# Patient Record
Sex: MALE | Race: WHITE | Employment: OTHER | URBAN - METROPOLITAN AREA
[De-identification: names, ages, dates, MRNs, and addresses within clinical notes are randomized per-mention and may not be internally consistent; named-entity substitution may affect disease eponyms.]

---

## 2019-12-24 ENCOUNTER — HOSPITAL ENCOUNTER (INPATIENT)
Age: 49
LOS: 10 days | Discharge: NURSING, REHAB OR LONG-TERM CARE | DRG: 444 | End: 2020-01-03
Attending: EMERGENCY MEDICINE | Admitting: INTERNAL MEDICINE
Payer: MEDICARE

## 2019-12-24 ENCOUNTER — APPOINTMENT (OUTPATIENT)
Dept: CT IMAGING | Age: 49
DRG: 444 | End: 2019-12-24
Payer: MEDICARE

## 2019-12-24 ENCOUNTER — APPOINTMENT (OUTPATIENT)
Dept: GENERAL RADIOLOGY | Age: 49
DRG: 444 | End: 2019-12-24
Payer: MEDICARE

## 2019-12-24 PROBLEM — K81.0 ACUTE CHOLECYSTITIS: Status: ACTIVE | Noted: 2019-12-24

## 2019-12-24 LAB
A/G RATIO: 0.5 (ref 1.1–2.2)
A/G RATIO: 0.5 (ref 1.1–2.2)
ALBUMIN SERPL-MCNC: 2.2 G/DL (ref 3.4–5)
ALBUMIN SERPL-MCNC: 2.2 G/DL (ref 3.4–5)
ALP BLD-CCNC: 132 U/L (ref 40–129)
ALP BLD-CCNC: 137 U/L (ref 40–129)
ALT SERPL-CCNC: 21 U/L (ref 10–40)
ALT SERPL-CCNC: 24 U/L (ref 10–40)
AMMONIA: 58 UMOL/L (ref 16–60)
AMORPHOUS: ABNORMAL /HPF
ANION GAP SERPL CALCULATED.3IONS-SCNC: 10 MMOL/L (ref 3–16)
ANION GAP SERPL CALCULATED.3IONS-SCNC: 8 MMOL/L (ref 3–16)
AST SERPL-CCNC: 115 U/L (ref 15–37)
AST SERPL-CCNC: 74 U/L (ref 15–37)
BASOPHILS ABSOLUTE: 0 K/UL (ref 0–0.2)
BASOPHILS RELATIVE PERCENT: 0.3 %
BILIRUB SERPL-MCNC: 2.4 MG/DL (ref 0–1)
BILIRUB SERPL-MCNC: 2.6 MG/DL (ref 0–1)
BILIRUBIN URINE: ABNORMAL
BLOOD, URINE: ABNORMAL
BUN BLDV-MCNC: 14 MG/DL (ref 7–20)
BUN BLDV-MCNC: 14 MG/DL (ref 7–20)
CALCIUM SERPL-MCNC: 8.7 MG/DL (ref 8.3–10.6)
CALCIUM SERPL-MCNC: 8.7 MG/DL (ref 8.3–10.6)
CHLORIDE BLD-SCNC: 86 MMOL/L (ref 99–110)
CHLORIDE BLD-SCNC: 87 MMOL/L (ref 99–110)
CLARITY: CLEAR
CO2: 31 MMOL/L (ref 21–32)
CO2: 33 MMOL/L (ref 21–32)
COLOR: YELLOW
CREAT SERPL-MCNC: <0.5 MG/DL (ref 0.9–1.3)
CREAT SERPL-MCNC: <0.5 MG/DL (ref 0.9–1.3)
EOSINOPHILS ABSOLUTE: 0 K/UL (ref 0–0.6)
EOSINOPHILS RELATIVE PERCENT: 0.4 %
EPITHELIAL CELLS, UA: ABNORMAL /HPF
GFR AFRICAN AMERICAN: >60
GFR AFRICAN AMERICAN: >60
GFR NON-AFRICAN AMERICAN: >60
GFR NON-AFRICAN AMERICAN: >60
GLOBULIN: 4.4 G/DL
GLOBULIN: 4.6 G/DL
GLUCOSE BLD-MCNC: 84 MG/DL (ref 70–99)
GLUCOSE BLD-MCNC: 87 MG/DL (ref 70–99)
GLUCOSE URINE: 100 MG/DL
HCT VFR BLD CALC: 41.1 % (ref 40.5–52.5)
HEMOGLOBIN: 14.3 G/DL (ref 13.5–17.5)
KETONES, URINE: ABNORMAL MG/DL
LACTIC ACID: 2.1 MMOL/L (ref 0.4–2)
LEUKOCYTE ESTERASE, URINE: NEGATIVE
LIPASE: 31 U/L (ref 13–60)
LYMPHOCYTES ABSOLUTE: 1.2 K/UL (ref 1–5.1)
LYMPHOCYTES RELATIVE PERCENT: 42.6 %
MCH RBC QN AUTO: 36.6 PG (ref 26–34)
MCHC RBC AUTO-ENTMCNC: 34.7 G/DL (ref 31–36)
MCV RBC AUTO: 105.7 FL (ref 80–100)
MICROSCOPIC EXAMINATION: YES
MONOCYTES ABSOLUTE: 0.3 K/UL (ref 0–1.3)
MONOCYTES RELATIVE PERCENT: 10.6 %
NEUTROPHILS ABSOLUTE: 1.3 K/UL (ref 1.7–7.7)
NEUTROPHILS RELATIVE PERCENT: 46.1 %
NITRITE, URINE: NEGATIVE
PDW BLD-RTO: 14 % (ref 12.4–15.4)
PH UA: 5.5 (ref 5–8)
PLATELET # BLD: 116 K/UL (ref 135–450)
PMV BLD AUTO: 9.1 FL (ref 5–10.5)
POTASSIUM SERPL-SCNC: 4.7 MMOL/L (ref 3.5–5.1)
POTASSIUM SERPL-SCNC: 5.4 MMOL/L (ref 3.5–5.1)
PROTEIN UA: NEGATIVE MG/DL
RBC # BLD: 3.89 M/UL (ref 4.2–5.9)
RBC UA: ABNORMAL /HPF (ref 0–2)
SODIUM BLD-SCNC: 127 MMOL/L (ref 136–145)
SODIUM BLD-SCNC: 128 MMOL/L (ref 136–145)
SPECIFIC GRAVITY UA: 1.01 (ref 1–1.03)
TOTAL PROTEIN: 6.6 G/DL (ref 6.4–8.2)
TOTAL PROTEIN: 6.8 G/DL (ref 6.4–8.2)
URINE TYPE: ABNORMAL
UROBILINOGEN, URINE: 2 E.U./DL
WBC # BLD: 2.8 K/UL (ref 4–11)
WBC UA: ABNORMAL /HPF (ref 0–5)

## 2019-12-24 PROCEDURE — 6360000004 HC RX CONTRAST MEDICATION: Performed by: EMERGENCY MEDICINE

## 2019-12-24 PROCEDURE — 2700000000 HC OXYGEN THERAPY PER DAY

## 2019-12-24 PROCEDURE — 71045 X-RAY EXAM CHEST 1 VIEW: CPT

## 2019-12-24 PROCEDURE — 85025 COMPLETE CBC W/AUTO DIFF WBC: CPT

## 2019-12-24 PROCEDURE — 82140 ASSAY OF AMMONIA: CPT

## 2019-12-24 PROCEDURE — 80053 COMPREHEN METABOLIC PANEL: CPT

## 2019-12-24 PROCEDURE — 5A09457 ASSISTANCE WITH RESPIRATORY VENTILATION, 24-96 CONSECUTIVE HOURS, CONTINUOUS POSITIVE AIRWAY PRESSURE: ICD-10-PCS | Performed by: INTERNAL MEDICINE

## 2019-12-24 PROCEDURE — 83605 ASSAY OF LACTIC ACID: CPT

## 2019-12-24 PROCEDURE — 87086 URINE CULTURE/COLONY COUNT: CPT

## 2019-12-24 PROCEDURE — 99291 CRITICAL CARE FIRST HOUR: CPT

## 2019-12-24 PROCEDURE — 80165 DIPROPYLACETIC ACID FREE: CPT

## 2019-12-24 PROCEDURE — 81001 URINALYSIS AUTO W/SCOPE: CPT

## 2019-12-24 PROCEDURE — 6360000002 HC RX W HCPCS

## 2019-12-24 PROCEDURE — 83690 ASSAY OF LIPASE: CPT

## 2019-12-24 PROCEDURE — 51702 INSERT TEMP BLADDER CATH: CPT

## 2019-12-24 PROCEDURE — 2580000003 HC RX 258: Performed by: NURSE PRACTITIONER

## 2019-12-24 PROCEDURE — 96375 TX/PRO/DX INJ NEW DRUG ADDON: CPT

## 2019-12-24 PROCEDURE — 2580000003 HC RX 258

## 2019-12-24 PROCEDURE — 74177 CT ABD & PELVIS W/CONTRAST: CPT

## 2019-12-24 PROCEDURE — 93005 ELECTROCARDIOGRAM TRACING: CPT | Performed by: NURSE PRACTITIONER

## 2019-12-24 PROCEDURE — 1200000000 HC SEMI PRIVATE

## 2019-12-24 PROCEDURE — 94761 N-INVAS EAR/PLS OXIMETRY MLT: CPT

## 2019-12-24 PROCEDURE — 87040 BLOOD CULTURE FOR BACTERIA: CPT

## 2019-12-24 PROCEDURE — 96376 TX/PRO/DX INJ SAME DRUG ADON: CPT

## 2019-12-24 PROCEDURE — 6360000002 HC RX W HCPCS: Performed by: NURSE PRACTITIONER

## 2019-12-24 PROCEDURE — 96365 THER/PROPH/DIAG IV INF INIT: CPT

## 2019-12-24 RX ORDER — PHENOBARBITAL 30 MG/1
30 TABLET ORAL 3 TIMES DAILY
Status: DISCONTINUED | OUTPATIENT
Start: 2019-12-25 | End: 2020-01-03 | Stop reason: HOSPADM

## 2019-12-24 RX ORDER — ACETAMINOPHEN 650 MG/1
650 SUPPOSITORY RECTAL EVERY 4 HOURS PRN
Status: DISCONTINUED | OUTPATIENT
Start: 2019-12-24 | End: 2020-01-03 | Stop reason: HOSPADM

## 2019-12-24 RX ORDER — SODIUM CHLORIDE 0.9 % (FLUSH) 0.9 %
10 SYRINGE (ML) INJECTION PRN
Status: DISCONTINUED | OUTPATIENT
Start: 2019-12-24 | End: 2020-01-03 | Stop reason: HOSPADM

## 2019-12-24 RX ORDER — ONDANSETRON 2 MG/ML
4 INJECTION INTRAMUSCULAR; INTRAVENOUS EVERY 6 HOURS PRN
Status: DISCONTINUED | OUTPATIENT
Start: 2019-12-24 | End: 2020-01-03 | Stop reason: HOSPADM

## 2019-12-24 RX ORDER — SODIUM CHLORIDE 9 MG/ML
INJECTION, SOLUTION INTRAVENOUS CONTINUOUS
Status: DISCONTINUED | OUTPATIENT
Start: 2019-12-25 | End: 2019-12-26

## 2019-12-24 RX ORDER — POTASSIUM CHLORIDE 7.45 MG/ML
10 INJECTION INTRAVENOUS PRN
Status: DISCONTINUED | OUTPATIENT
Start: 2019-12-24 | End: 2020-01-03 | Stop reason: HOSPADM

## 2019-12-24 RX ORDER — FENTANYL CITRATE 50 UG/ML
25 INJECTION, SOLUTION INTRAMUSCULAR; INTRAVENOUS ONCE
Status: COMPLETED | OUTPATIENT
Start: 2019-12-24 | End: 2019-12-24

## 2019-12-24 RX ORDER — FENTANYL CITRATE 50 UG/ML
INJECTION, SOLUTION INTRAMUSCULAR; INTRAVENOUS
Status: COMPLETED
Start: 2019-12-24 | End: 2019-12-24

## 2019-12-24 RX ORDER — 0.9 % SODIUM CHLORIDE 0.9 %
500 INTRAVENOUS SOLUTION INTRAVENOUS ONCE
Status: COMPLETED | OUTPATIENT
Start: 2019-12-24 | End: 2019-12-24

## 2019-12-24 RX ORDER — POTASSIUM CHLORIDE 20 MEQ/1
40 TABLET, EXTENDED RELEASE ORAL PRN
Status: DISCONTINUED | OUTPATIENT
Start: 2019-12-24 | End: 2020-01-03 | Stop reason: HOSPADM

## 2019-12-24 RX ORDER — SODIUM CHLORIDE 0.9 % (FLUSH) 0.9 %
10 SYRINGE (ML) INJECTION EVERY 12 HOURS SCHEDULED
Status: DISCONTINUED | OUTPATIENT
Start: 2019-12-25 | End: 2020-01-03 | Stop reason: HOSPADM

## 2019-12-24 RX ORDER — MAGNESIUM SULFATE 1 G/100ML
1 INJECTION INTRAVENOUS PRN
Status: DISCONTINUED | OUTPATIENT
Start: 2019-12-24 | End: 2020-01-03 | Stop reason: HOSPADM

## 2019-12-24 RX ORDER — LEVOTHYROXINE SODIUM 88 UG/1
88 TABLET ORAL DAILY
Status: DISCONTINUED | OUTPATIENT
Start: 2019-12-25 | End: 2020-01-03 | Stop reason: HOSPADM

## 2019-12-24 RX ADMIN — PIPERACILLIN AND TAZOBACTAM 3.38 G: 3; .375 INJECTION, POWDER, LYOPHILIZED, FOR SOLUTION INTRAVENOUS at 22:48

## 2019-12-24 RX ADMIN — SODIUM CHLORIDE: 9 INJECTION, SOLUTION INTRAVENOUS at 23:51

## 2019-12-24 RX ADMIN — FENTANYL CITRATE 25 MCG: 50 INJECTION, SOLUTION INTRAMUSCULAR; INTRAVENOUS at 23:16

## 2019-12-24 RX ADMIN — IOPAMIDOL 75 ML: 755 INJECTION, SOLUTION INTRAVENOUS at 21:38

## 2019-12-24 RX ADMIN — FENTANYL CITRATE 25 MCG: 50 INJECTION, SOLUTION INTRAMUSCULAR; INTRAVENOUS at 20:10

## 2019-12-24 RX ADMIN — SODIUM CHLORIDE 500 ML: 9 INJECTION, SOLUTION INTRAVENOUS at 20:10

## 2019-12-24 RX ADMIN — SODIUM CHLORIDE 500 ML: 9 INJECTION, SOLUTION INTRAVENOUS at 23:52

## 2019-12-24 ASSESSMENT — PAIN SCALES - GENERAL
PAINLEVEL_OUTOF10: 0
PAINLEVEL_OUTOF10: 9
PAINLEVEL_OUTOF10: 7

## 2019-12-24 NOTE — ED PROVIDER NOTES
[facility] Emergency Department    CHIEF COMPLAINT  Abdominal Pain (seen at [facility] recently for 5 days for pancreatitis and Possible colitis, large amount of multiple complaints, had colonoscopy on dec 30)      HISTORY OF PRESENT ILLNESS  Chris Hook is a 44 y.o. male who presents to the ED complaining of worsening pain over the past few days. Patients sister at bedside Josh Hackett is non verbal but normally does not grown as much as he is now. \"  Patient at bedside reports that he is mentally challenged and nonverbal but is at baseline other than groaning more than usual.  Patient reports approximately 1 month ago he was having similar symptoms with fever and he was taken to [facility] where he was admitted. Patient sister reports that he had elevated thyroid, liver, and kidney issues at that time. Patient was diagnosed with possibly pancreatitis versus colitis and put on antibiotics. Patient sister reports that he did improve shortly however symptoms seem to have returned but no fever at this time. Patient does reside in a group home and per caretaker he has had increase in gas, belching, and loose bowel movements today. Patient does have a G-tube placed approximately 1 year. Patient receives all medications and food through G-tube per sister. Patient seen Dr. Julio Cesar Cruz with [facility] on 12/17 and has a colonoscopy and endoscopy scheduled for 12/30. Patient's sister reports no dark or tarry stools. No known fever or chills. Patient is paralyzed on the right side upper extremity and on the waist down. Patient was hypoxic upon arrival with a room air of 85%. Patient does not normally wear oxygen. Patient sister reports some congestion and cough over the past few weeks. No other complaints, modifying factors or associated symptoms. Nursing notes reviewed.    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 Can give every day prn      PEDIATRIC MULTIPLE VIT-C-FA PO Take by mouth daily      Corn Oil OIL 1 capsule by Gastrostomy Tube route 3 times daily      docusate (COLACE) 50 MG/5ML liquid 100 mg by Per G Tube route 2 times daily      polyethylene glycol (GLYCOLAX) packet 8.5 g by Per G Tube route daily      loratadine (CLARITIN) 10 MG tablet 10 mg by Per G Tube route daily as needed      LORazepam (ATIVAN) 0.5 MG tablet 0.5 mg by Per G Tube route as needed for Anxiety.  magnesium hydroxide (MILK OF MAGNESIA) 400 MG/5ML suspension Take 30 mLs by mouth daily as needed for Constipation      MILK THISTLE PO 1 capsule by Gastric Tube route 2 times daily      Ostomy Supplies (MOISTURE BARRIER SKIN) OINT by Does not apply route 2 times daily      PHENobarbital (LUMINAL) 30 MG tablet 30 mg by Per G Tube route.  Give 30 mg tid on Sun-Tues-Thurs-Sat and 30 mg bid on Mon-Wed-Fri      promethazine (PHENERGAN) 25 MG suppository Place 25 mg rectally every 4 hours as needed for Nausea      acetaminophen (Q-PAP CHILDRENS) 160 MG/5ML suspension by Per G Tube route every 4 hours as needed for Fever Give 20 cc every 4 hours prn      levothyroxine (SYNTHROID) 88 MCG tablet 88 mcg by Per G Tube route Daily      valproate (DEPAKENE) 250 MG/5ML solution 700-1,000 mg by Per G Tube route 3 times daily Takes 750 mg daily and 1000 mg bid       No Known Allergies    REVIEW OF SYSTEMS  10 systems reviewed, pertinent positives per HPI otherwise noted to be negative    PHYSICAL EXAM  /74   Pulse 115   Temp 97.8 °F (36.6 °C) (Oral)   Resp 14   Ht 4' 9\" (1.448 m)   Wt 125 lb (56.7 kg)   SpO2 94%   BMI 27.05 kg/m²   GENERAL APPEARANCE: Awake and alert. Cooperative. No acute distress. HEAD: Normocephalic. Atraumatic. EYES: PERRL. Sclera, jaundice bilaterally. ENT: Mucous membranes are pink and dry. Nares unobstructed. NECK: Supple. HEART: No Chest wall tenderness. LUNGS: Respirations unlabored. CTAB. Good air exchange. Speaking comfortably in full sentences. ABDOMEN: Soft. Non-distended. +bs x 4 quadrants. No guarding or rebound. gtube present to mid abdomen. EXTREMITIES: No peripheral edema. Limited range of motion due to paralysis. Unable to move right upper and bilateral lower extremities. All extremities neurovascularly intact. Cap refill. Pulses palpable equal bilaterally. SKIN: Warm and dry. No acute rashes. NEUROLOGICAL: Alert. PSYCHIATRIC: Normal mood and affect. RADIOLOGY  Ct Abdomen Pelvis W Iv Contrast Additional Contrast? None    Result Date: 12/24/2019  EXAMINATION: CT OF THE ABDOMEN AND PELVIS WITH CONTRAST 12/24/2019 9:16 pm TECHNIQUE: CT of the abdomen and pelvis was performed with the administration of intravenous contrast. Multiplanar reformatted images are provided for review. Dose modulation, iterative reconstruction, and/or weight based adjustment of the mA/kV was utilized to reduce the radiation dose to as low as reasonably achievable. COMPARISON: 12/24/2009 HISTORY: ORDERING SYSTEM PROVIDED HISTORY: abd pain, gtube present TECHNOLOGIST PROVIDED HISTORY: Reason for exam:->abd pain, gtube present Additional Contrast?->None Reason for Exam: abd pain, hx  pancreatitis recently Acuity: Acute Type of Exam: Initial Relevant Medical/Surgical History: pt nonverbal hx from family FINDINGS: Lower Chest: There is bibasilar atelectasis. Organs: Low-attenuation of the liver may represent fatty infiltration. There is no focal liver lesion. There appears to be mucosal enhancement of the gallbladder with minimal adjacent inflammatory stranding. No definite cholelithiasis is identified. There is no biliary ductal dilation. The spleen, pancreas, adrenal glands and kidneys are unremarkable. GI/Bowel: Percutaneous gastrostomy tube is in place. Small bowel caliber is normal.  The appendix is normal.  Fatty infiltration in the wall of the cecum and ascending colon is nonspecific though may indicate chronic inflammation. Pelvis: There is a catheter in the urinary bladder. Peritoneum/Retroperitoneum: There is trace fluid in the right paracolic gutter with minimal right upper quadrant fat stranding. There is no free fluid in the pelvis and there is no adenopathy. Aortic caliber is normal without dissection. Bones/Soft Tissues: No acute findings. Severe scoliosis deformity and chronic deformity of the hips is again identified. Spinal fixation rods are also again noted. Acute cholecystitis is suspected. Xr Chest Portable    Result Date: 12/24/2019  EXAMINATION: ONE XRAY VIEW OF THE CHEST 12/24/2019 4:14 pm COMPARISON: 12/30/2009 HISTORY: ORDERING SYSTEM PROVIDED HISTORY: cough, hypoxia TECHNOLOGIST PROVIDED HISTORY: Reason for exam:->cough, hypoxia Reason for Exam: Abdominal Pain (seen at [facility] for 5 days for pancreatitis and Possible colitis, large amount of multiple complaints, had colonoscopy on dec 30) Acuity: Acute Type of Exam: Initial FINDINGS: There are low lung volumes with secondary bronchovascular crowding. No acute focal infiltrate is identified. The heart and mediastinal contours are unremarkable. No pneumothorax is seen. No free air. No acute bony abnormality. Scoliotic curvature of the thoracic spine to the right is again detected. Negative low lung volume portable examination. ED COURSE   I have evaluated this patient in collaboration with Dr Haydee Flores. Vital signs stable, patient tachycardic upon arrival. EKG was performed and interpreted by ER attending.   Patient was also hypoxic upon arrival at 85% on room air patient was placed on 2 L nasal cannula with improvement of oxygen saturation level to 94 to 95%. Patient was given IV fluids and heart rate improved. Patient received fentanyl for pain, with good relief. Phan catheter was placed, urinalysis obtained. Urinalysis revealed 3-5 WBCs, 20-50 RBCs, negative for nitrites. Urine culture sent and pending. Initial CMP revealed slight hemolysis therefore repeat was performed. Sodium 127, chloride 86, alkaline phos 137, total bili 2.6. CBC revealed leukopenia at 2.8 with a stable hemoglobin and hematocrit. Lipase normal at 31. Ammonia 58. Blood cultures drawn x2 pending. Lactic acid lately elevated at 2.1. Valproic acid level pending. Radiological imaging of the chest revealed negative low lung volume per radiologist.  CT abdomen and pelvis performed and revealed acute cholecystitis. IV Zosyn was initiated during ED course of treatment and consult performed with on-call general surgeon. I did speak with  regarding Mr. Kodi Bravo and ED findings, patient will be admitted under hospitalist services. A discussion was had with family of Mr. Kodi Bravo regarding ED findings, results, and admission. All questions were answered. Patient family agreeable with plan of care, treatment, and admission at this time. Consult performed per myself with hospitalist regarding admission. Total critical care time is 35 minutes, which excludes separately billable procedures and updating family. Time spent is specifically for management of the presenting complaint and symptoms initially, direct bedside care, reevaluation, review of records, and consultation. There was a high probability of clinically significant life-threatening deterioration in the patient's condition, which required my urgent intervention.    MDM  Results for orders placed or performed during the hospital encounter of 12/24/19   CBC auto differential   Result Value Ref Range    WBC 2.8 (L) 4.0 - 11.0 K/uL    RBC 3.89 (L) 4.20 - 5.90 M/uL Elevated lactic acid level    7. Hypoxia        Vitals:  Blood pressure 106/83, pulse 98, temperature 97.8 °F (36.6 °C), temperature source Oral, resp. rate 17, height 4' 9\" (1.448 m), weight 125 lb (56.7 kg), SpO2 94 %.       DISPOSITION  Patient was admitted in stable condition                 HARJINDER Ling CNP  12/25/19 0000

## 2019-12-25 LAB
ALBUMIN SERPL-MCNC: 2.3 G/DL (ref 3.4–5)
ALP BLD-CCNC: 142 U/L (ref 40–129)
ALT SERPL-CCNC: 22 U/L (ref 10–40)
ANION GAP SERPL CALCULATED.3IONS-SCNC: 8 MMOL/L (ref 3–16)
AST SERPL-CCNC: 79 U/L (ref 15–37)
BILIRUB SERPL-MCNC: 2.7 MG/DL (ref 0–1)
BILIRUBIN DIRECT: 2 MG/DL (ref 0–0.3)
BILIRUBIN, INDIRECT: 0.7 MG/DL (ref 0–1)
BUN BLDV-MCNC: 12 MG/DL (ref 7–20)
CALCIUM SERPL-MCNC: 8.5 MG/DL (ref 8.3–10.6)
CHLORIDE BLD-SCNC: 93 MMOL/L (ref 99–110)
CO2: 31 MMOL/L (ref 21–32)
CREAT SERPL-MCNC: <0.5 MG/DL (ref 0.9–1.3)
EKG ATRIAL RATE: 98 BPM
EKG DIAGNOSIS: NORMAL
EKG P AXIS: 63 DEGREES
EKG P-R INTERVAL: 130 MS
EKG Q-T INTERVAL: 344 MS
EKG QRS DURATION: 76 MS
EKG QTC CALCULATION (BAZETT): 439 MS
EKG R AXIS: 106 DEGREES
EKG T AXIS: 44 DEGREES
EKG VENTRICULAR RATE: 98 BPM
GFR AFRICAN AMERICAN: >60
GFR NON-AFRICAN AMERICAN: >60
GLUCOSE BLD-MCNC: 74 MG/DL (ref 70–99)
GLUCOSE BLD-MCNC: 74 MG/DL (ref 70–99)
GLUCOSE BLD-MCNC: 83 MG/DL (ref 70–99)
GLUCOSE BLD-MCNC: 88 MG/DL (ref 70–99)
HCT VFR BLD CALC: 42.6 % (ref 40.5–52.5)
HEMOGLOBIN: 14.5 G/DL (ref 13.5–17.5)
MCH RBC QN AUTO: 36.3 PG (ref 26–34)
MCHC RBC AUTO-ENTMCNC: 34 G/DL (ref 31–36)
MCV RBC AUTO: 106.7 FL (ref 80–100)
PDW BLD-RTO: 14.4 % (ref 12.4–15.4)
PERFORMED ON: NORMAL
PLATELET # BLD: 102 K/UL (ref 135–450)
PMV BLD AUTO: 9 FL (ref 5–10.5)
POTASSIUM REFLEX MAGNESIUM: 4.4 MMOL/L (ref 3.5–5.1)
RBC # BLD: 3.99 M/UL (ref 4.2–5.9)
SODIUM BLD-SCNC: 132 MMOL/L (ref 136–145)
TOTAL PROTEIN: 6.7 G/DL (ref 6.4–8.2)
WBC # BLD: 2.9 K/UL (ref 4–11)

## 2019-12-25 PROCEDURE — 36415 COLL VENOUS BLD VENIPUNCTURE: CPT

## 2019-12-25 PROCEDURE — 93010 ELECTROCARDIOGRAM REPORT: CPT | Performed by: INTERNAL MEDICINE

## 2019-12-25 PROCEDURE — 2580000003 HC RX 258: Performed by: INTERNAL MEDICINE

## 2019-12-25 PROCEDURE — 80076 HEPATIC FUNCTION PANEL: CPT

## 2019-12-25 PROCEDURE — 94761 N-INVAS EAR/PLS OXIMETRY MLT: CPT

## 2019-12-25 PROCEDURE — 6370000000 HC RX 637 (ALT 250 FOR IP): Performed by: INTERNAL MEDICINE

## 2019-12-25 PROCEDURE — 6360000002 HC RX W HCPCS: Performed by: INTERNAL MEDICINE

## 2019-12-25 PROCEDURE — 84443 ASSAY THYROID STIM HORMONE: CPT

## 2019-12-25 PROCEDURE — 85027 COMPLETE CBC AUTOMATED: CPT

## 2019-12-25 PROCEDURE — 99222 1ST HOSP IP/OBS MODERATE 55: CPT | Performed by: SURGERY

## 2019-12-25 PROCEDURE — 84439 ASSAY OF FREE THYROXINE: CPT

## 2019-12-25 PROCEDURE — 1200000000 HC SEMI PRIVATE

## 2019-12-25 PROCEDURE — 80048 BASIC METABOLIC PNL TOTAL CA: CPT

## 2019-12-25 PROCEDURE — 80184 ASSAY OF PHENOBARBITAL: CPT

## 2019-12-25 PROCEDURE — 2700000000 HC OXYGEN THERAPY PER DAY

## 2019-12-25 RX ORDER — SODIUM CHLORIDE 9 MG/ML
INJECTION, SOLUTION INTRAVENOUS
Status: COMPLETED
Start: 2019-12-25 | End: 2019-12-24

## 2019-12-25 RX ORDER — MORPHINE SULFATE 2 MG/ML
2 INJECTION, SOLUTION INTRAMUSCULAR; INTRAVENOUS EVERY 4 HOURS PRN
Status: DISCONTINUED | OUTPATIENT
Start: 2019-12-25 | End: 2020-01-02

## 2019-12-25 RX ADMIN — MORPHINE SULFATE 2 MG: 2 INJECTION, SOLUTION INTRAMUSCULAR; INTRAVENOUS at 16:17

## 2019-12-25 RX ADMIN — PHENOBARBITAL 30 MG: 30 TABLET ORAL at 14:38

## 2019-12-25 RX ADMIN — Medication 10 ML: at 09:25

## 2019-12-25 RX ADMIN — PHENOBARBITAL 30 MG: 30 TABLET ORAL at 00:39

## 2019-12-25 RX ADMIN — PHENOBARBITAL 30 MG: 30 TABLET ORAL at 20:37

## 2019-12-25 RX ADMIN — MORPHINE SULFATE 2 MG: 2 INJECTION, SOLUTION INTRAMUSCULAR; INTRAVENOUS at 20:55

## 2019-12-25 RX ADMIN — Medication 10 ML: at 20:38

## 2019-12-25 RX ADMIN — PHENOBARBITAL 30 MG: 30 TABLET ORAL at 09:25

## 2019-12-25 RX ADMIN — ONDANSETRON 4 MG: 2 INJECTION INTRAMUSCULAR; INTRAVENOUS at 09:19

## 2019-12-25 RX ADMIN — VALPROIC ACID 500 MG: 250 SOLUTION ORAL at 00:39

## 2019-12-25 RX ADMIN — PIPERACILLIN AND TAZOBACTAM 3.38 G: 3; .375 INJECTION, POWDER, FOR SOLUTION INTRAVENOUS at 20:37

## 2019-12-25 RX ADMIN — PIPERACILLIN AND TAZOBACTAM 3.38 G: 3; .375 INJECTION, POWDER, FOR SOLUTION INTRAVENOUS at 05:09

## 2019-12-25 RX ADMIN — VALPROIC ACID 500 MG: 250 SOLUTION ORAL at 09:25

## 2019-12-25 RX ADMIN — ONDANSETRON 4 MG: 2 INJECTION INTRAMUSCULAR; INTRAVENOUS at 21:02

## 2019-12-25 RX ADMIN — VALPROIC ACID 500 MG: 250 SOLUTION ORAL at 20:37

## 2019-12-25 RX ADMIN — SODIUM CHLORIDE: 9 INJECTION, SOLUTION INTRAVENOUS at 11:07

## 2019-12-25 RX ADMIN — VALPROIC ACID 500 MG: 250 SOLUTION ORAL at 14:38

## 2019-12-25 RX ADMIN — MORPHINE SULFATE 2 MG: 2 INJECTION, SOLUTION INTRAMUSCULAR; INTRAVENOUS at 09:21

## 2019-12-25 RX ADMIN — PIPERACILLIN AND TAZOBACTAM 3.38 G: 3; .375 INJECTION, POWDER, FOR SOLUTION INTRAVENOUS at 12:37

## 2019-12-25 ASSESSMENT — PAIN SCALES - GENERAL
PAINLEVEL_OUTOF10: 6
PAINLEVEL_OUTOF10: 6
PAINLEVEL_OUTOF10: 7

## 2019-12-25 NOTE — ED PROVIDER NOTES
I independently performed a history and physical on Katya Nowak. All diagnostic, treatment, and disposition decisions were made by myself in conjunction with the advanced practice provider. For further details of [facility]re[facility] emergency department encounter, please see Jim Lino NP's documentation. 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 Patient here because of groaning and perceived abdominal pain. On exam patient is awake and is nonverbal but does seem to respond to pain. Tender in the abdomen. Labs and imaging consistent with acute cholecystitis. Patient being admitted for further treatment and evaluation. EKG  The Ekg interpreted by me shows  normal sinus rhythm with a rate of 98  Axis is   Right axis deviation  QTc is  normal  Intervals and Durations are unremarkable. ST Segments: normal  No prior EKG available for comparison.              Sarah Nieves MD  12/25/19 [id number]

## 2019-12-25 NOTE — H&P
Hospital Medicine History & Physical      PCP: Ambar Linarichard    Date of Admission: 12/24/2019    Date of Service: Pt seen/examined on 12/25/19 and Admitted to Inpatient     Chief Complaint:  Abdominal pain       History Of Present Illness: The patient is a 44 y.o. male who presents to [facility] ([facility]) with acute onset and progressive course of abdominal pain. Pt is non verbal hx obtained from family and records. Per family pt. With increasing groans over the last few days pt. 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 No pertinent surgical history. Medications Prior to Admission:    Prior to Admission medications    Medication Sig Start Date End Date Taking? Authorizing Provider   acetaminophen (TYLENOL) 650 MG suppository Place 650 mg rectally every 4 hours as needed for Fever   Yes Historical Provider, MD   albuterol (PROVENTIL) (5 MG/ML) 0.5% nebulizer solution Take 2.5 mg by nebulization every 4 hours as needed for Wheezing   Yes Historical Provider, MD   aluminum & magnesium hydroxide-simethicone (MAALOX) 200-200-20 MG/5ML SUSP suspension Take 15 mLs by mouth daily   Yes Historical Provider, MD   bisacodyl (DULCOLAX) 10 MG suppository Place 10 mg rectally every other day Give every other evening. Can give every day prn   Yes Historical Provider, MD   PEDIATRIC MULTIPLE VIT-C-FA PO Take by mouth daily   Yes Historical Provider, MD   [place] Oil OIL 1 capsule by Gastrostomy Tube route 3 times daily   Yes Historical Provider, MD   docusate (COLACE) 50 MG/5ML liquid 100 mg by Per G Tube route 2 times daily   Yes Historical Provider, MD   polyethylene glycol (GLYCOLAX) packet 8.5 g by Per G Tube route daily   Yes Historical Provider, MD   LORazepam (ATIVAN) 0.5 MG tablet 0.5 mg by Per G Tube route as needed for Anxiety. Yes Historical Provider, MD   magnesium hydroxide (MILK OF MAGNESIA) 400 MG/5ML suspension Take 30 mLs by mouth daily as needed for Constipation   Yes Historical Provider, MD   MILK THISTLE PO 1 capsule by Gastric Tube route 2 times daily   Yes Historical Provider, MD   PHENobarbital (LUMINAL) 30 MG tablet 30 mg by Per G Tube route. Give 30 mg tid on Sun-Tues-Thurs-Sat and 30 mg bid on Mon-Wed-Fri   Yes Historical Provider, MD   acetaminophen (Q-PAP CHILDRENS) 160 MG/5ML suspension by Per G Tube route every 4 hours as needed for Fever Give 20 cc every 4 hours prn   Yes Historical Provider, MD   levothyroxine (SYNTHROID) 88 MCG tablet 88 mcg by Per G Tube route Daily   Yes Historical Provider, MD   valproate (DEPAKENE) 250 MG/5ML solution 700-1,000 mg by Per G Tube route 3 times daily Takes 750 mg daily and 1000 mg bid   Yes Historical Provider, MD   dicyclomine (BENTYL) 20 MG tablet Take 20 mg by mouth every 8 hours as needed    Historical Provider, MD   loratadine (CLARITIN) 10 MG tablet 10 mg by Per G Tube route daily as needed    Historical Provider, MD   Ostomy Supplies (MOISTURE BARRIER SKIN) OINT by Does not apply route 2 times daily    Historical Provider, MD   promethazine (PHENERGAN) 25 MG suppository Place 25 mg rectally every 4 hours as needed for Nausea    Historical Provider, MD       Allergies:  Patient has no known allergies. Social History:  The patient currently lives group home     TOBACCO:   reports that he has never smoked. He has never used smokeless tobacco.  ETOH:   has no history on file for alcohol.       Family History:  Reviewed in detail and negative for DM, Early CAD, Cancer, CVA. Positive as follows:    History reviewed. No pertinent family history. REVIEW OF SYSTEMS:   Positive for abdominal pain  and as noted in the HPI. All other systems reviewed and negative. PHYSICAL EXAM:    /76   Pulse 99   Temp 98.1 °F (36.7 °C) (Axillary)   Resp 20   Ht 4' 9\" (1.448 m)   Wt 125 lb (56.7 kg)   SpO2 95%   BMI 27.05 kg/m²     General appearance: No apparent distress appears stated age HEENT Normal cephalic, atraumatic without obvious deformity. Pupils equal, round, and reactive to light. Extra ocular muscles intact. Conjunctivae/corneas clear. Neck: Supple, No jugular venous distention/bruits. Trachea midline without thyromegaly or adenopathy with full range of motion. Lungs: Clear to auscultation, bilaterally without Rales/Wheezes/Rhonchi with good respiratory effort. Heart: Regular rate and rhythm with Normal S1/S2 without murmurs, rubs or gallops, point of maximum impulse non-displaced  Abdomen: Soft, non-tender or non-distended without rigidity or guarding and positive bowel sounds all four quadrants. Extremities: No clubbing, cyanosis, or edema bilaterally. .  Skin: Skin color, texture, turgor normal.  No rashes or lesions. Neurologic: Alert ,, grossly non-focal.  Mental status: Alert,   Capillary Refill: Acceptable  < 3 seconds  Peripheral Pulses: +3 Easily felt, not easily obliterated with pressure      CXR:  I have reviewed the CXR  EKG:  I have reviewed the EKG   CBC   Recent Labs     12/24/19 1942   WBC 2.8*   HGB 14.3   HCT 41.1   *      RENAL  Recent Labs     12/24/19 1942 12/24/19 1949   * 127*   K 5.4* 4.7   CL 87* 86*   CO2 31 33*   BUN 14 14   CREATININE <0.5* <0.5*     LFT'S  Recent Labs     12/24/19 1942 12/24/19 1949   * 74*   ALT 24 21   BILITOT 2.4* 2.6*   ALKPHOS 132* 137*     COAG  No results for input(s): INR in the last 72 hours.   CARDIAC ENZYMES  No results for input(s): CKTOTAL, CKMB, CKMBINDEX, TROPONINI in the last 72 hours. U/A:    Lab Results   Component Value Date    COLORU Yellow 12/24/2019    WBCUA 3-5 12/24/2019    RBCUA 20-50 12/24/2019    CLARITYU Clear 12/24/2019    SPECGRAV 1.015 12/24/2019    LEUKOCYTESUR Negative 12/24/2019    BLOODU LARGE 12/24/2019    GLUCOSEU 100 12/24/2019    GLUCOSEU Negative 12/24/2009    AMORPHOUS Rare 12/24/2019       ABG  No results found for: XWI6TUP, BEART, X2QAUHUI, PHART, THGBART, LAW1YHH, PO2ART, HFY8JGH        Active Hospital Problems    Diagnosis Date Noted    Acute cholecystitis [K81.0] 12/24/2019         PHYSICIANS CERTIFICATION:    I certify that Jason Phillips is expected to be hospitalized for more  than 2 midnights based on the following assessment and plan:      ASSESSMENT/PLAN:    Hyponatremia  Dysphagia  seizures   hypothyroidism    Npo  ivf  abx  Pain control  Resume home meds  Surgery consult    DVT Prophylaxis: lovenox   Diet: Diet NPO Effective Now  Code Status: DNR-CCA    Dispo - inpt. Dayla Alpers, MD    Thank you [facility] for the opportunity to be involved in this patient's care. If you have any questions or concerns please feel free to contact me at [phone].

## 2019-12-25 NOTE — PROGRESS NOTES
Patient was seen by 1 of my partners this morning    Acute cholecystitis-with abdominal pain-morphine 2 mg IV every 4 as needed, continue Zosyn and surgical input appreciated awaiting for gallbladder ultrasound      ? Cerebral palsy/quadriparesis /aphasia seizures-on phenobarbital and valproic acid-get levels      Blood sugar was 79 this morning-asymptomatic monitor D50 as needed      Hyponatremia-improved with normal saline    Macrocytosis-TSH X67 and folic acid      Microscopic hematuria-May repeat and follow-up as an outpatient

## 2019-12-25 NOTE — CONSULTS
PRN  acetaminophen (TYLENOL) suppository 650 mg, 650 mg, Rectal, Q4H PRN  0.9 % sodium chloride infusion, , Intravenous, Continuous  piperacillin-tazobactam (ZOSYN) 3.375 g in dextrose 5 % 50 mL IVPB extended infusion (mini-bag), 3.375 g, Intravenous, Q8H  Prior to Admission medications    Medication Sig Start Date End Date Taking? Authorizing Provider   acetaminophen (TYLENOL) 650 MG suppository Place 650 mg rectally every 4 hours as needed for Fever   Yes Historical Provider, MD   albuterol (PROVENTIL) (5 MG/ML) 0.5% nebulizer solution Take 2.5 mg by nebulization every 4 hours as needed for Wheezing   Yes Historical Provider, MD   aluminum & magnesium hydroxide-simethicone (MAALOX) 200-200-20 MG/5ML SUSP suspension Take 15 mLs by mouth daily   Yes Historical Provider, MD   bisacodyl (DULCOLAX) 10 MG suppository Place 10 mg rectally every other day Give every other evening. Can give every day prn   Yes Historical Provider, MD   PEDIATRIC MULTIPLE VIT-C-FA PO Take by mouth daily   Yes Historical Provider, MD   [place] Oil OIL 1 capsule by Gastrostomy Tube route 3 times daily   Yes Historical Provider, MD   docusate (COLACE) 50 MG/5ML liquid 100 mg by Per G Tube route 2 times daily   Yes Historical Provider, MD   polyethylene glycol (GLYCOLAX) packet 8.5 g by Per G Tube route daily   Yes Historical Provider, MD   LORazepam (ATIVAN) 0.5 MG tablet 0.5 mg by Per G Tube route as needed for Anxiety. Yes Historical Provider, MD   magnesium hydroxide (MILK OF MAGNESIA) 400 MG/5ML suspension Take 30 mLs by mouth daily as needed for Constipation   Yes Historical Provider, MD   MILK THISTLE PO 1 capsule by Gastric Tube route 2 times daily   Yes Historical Provider, MD   PHENobarbital (LUMINAL) 30 MG tablet 30 mg by Per G Tube route.  Give 30 mg tid on Sun-Tues-Thurs-Sat and 30 mg bid on Mon-Wed-Fri   Yes Historical Provider, MD   acetaminophen (Q-PAP CHILDRENS) 160 MG/5ML suspension by Per G Tube route every 4 hours as needed for clubbing or cyanosis, 1+ pitting edema lower extremities  NEUROLOGIC:  Mental Status Exam:  Level of Alertness:   awake  PSYCHIATRIC:   Non-verbal  SKIN:  no bruising or bleeding, normal skin color, texture, turgor and no redness, warmth, or swelling    DATA:    CBC:   Recent Labs     12/24/19 1942 12/25/19  0651   WBC 2.8* 2.9*   HGB 14.3 14.5   HCT 41.1 42.6   * 102*     BMP:    Recent Labs     12/24/19 1942 12/24/19 1949 12/25/19  0651   * 127* 132*   K 5.4* 4.7 4.4   CL 87* 86* 93*   CO2 31 33* 31   BUN 14 14 12   CREATININE <0.5* <0.5* <0.5*   GLUCOSE 87 84 74     Hepatic:   Recent Labs     12/24/19 1942 12/24/19 1949 12/25/19  0651   * 74* 79*   ALT 24 21 22   BILITOT 2.4* 2.6* 2.7*   ALKPHOS 132* 137* 142*     Mag:    No results for input(s): MG in the last 72 hours. Phos:   No results for input(s): PHOS in the last 72 hours. INR: No results for input(s): INR in the last 72 hours. Radiology Review: Images personally reviewed by me. EXAMINATION:  CT OF THE ABDOMEN AND PELVIS WITH CONTRAST 12/24/2019 9:16 pm    TECHNIQUE:  CT of the abdomen and pelvis was performed with the administration of  intravenous contrast. Multiplanar reformatted images are provided for review. Dose modulation, iterative reconstruction, and/or weight based adjustment of  the mA/kV was utilized to reduce the radiation dose to as low as reasonably  achievable. COMPARISON:  12/24/2009    HISTORY:  ORDERING SYSTEM PROVIDED HISTORY: abd pain, gtube present  TECHNOLOGIST PROVIDED HISTORY:  Reason for exam:->abd pain, gtube present  Additional Contrast?->None  Reason for Exam: abd pain, hx  pancreatitis recently  Acuity: Acute  Type of Exam: Initial  Relevant Medical/Surgical History: pt nonverbal hx from family    FINDINGS:  Lower Chest: There is bibasilar atelectasis.     Organs: Low-attenuation of the liver may represent fatty infiltration.  There  is no focal liver lesion.  There appears to be mucosal enhancement of the  gallbladder with minimal adjacent inflammatory stranding.  No definite  cholelithiasis is identified. Ava Cedar is no biliary ductal dilation.  The  spleen, pancreas, adrenal glands and kidneys are unremarkable. GI/Bowel: Percutaneous gastrostomy tube is in place.  Small bowel caliber is  normal.  The appendix is normal.  Fatty infiltration in the wall of the cecum  and ascending colon is nonspecific though may indicate chronic inflammation. Pelvis: There is a catheter in the urinary bladder. Peritoneum/Retroperitoneum: There is trace fluid in the right paracolic  gutter with minimal right upper quadrant fat stranding.  There is no free  fluid in the pelvis and there is no adenopathy.  Aortic caliber is normal  without dissection. Bones/Soft Tissues: No acute findings.  Severe scoliosis deformity and  chronic deformity of the hips is again identified.  Spinal fixation rods are  also again noted. Impression:     Acute cholecystitis is suspected. IMPRESSION/RECOMMENDATIONS:    Possible cholecystitis. No leukocytosis or focal pain on my exam. Continue antibiotics and supportive care. CHeck GB US.  Further plans based on result of US    Electronically signed by Lenard De MD     [id number]

## 2019-12-25 NOTE — PROGRESS NOTES
4 Eyes Skin Assessment     The patient is being assess for  Admission    I agree that 2 RN's have performed a thorough Head to Toe Skin Assessment on the patient. ALL assessment sites listed below have been assessed. Areas assessed by both nurses: Katalina Solomon RN   [x]   Head, Face, and Ears   [x]   Shoulders, Back, and Chest  [x]   Arms, Elbows, and Hands   [x]   Coccyx, Sacrum, and IschIum  [x]   Legs, Feet, and Heels   Stage 2 wound on R and L foot, and in gluteal cleft. Pt heels dry, feet clubbed. Does the Patient have Skin Breakdown?   Yes LDA WOUND CARE was Initiated documentation include the [place]-wound, Wound Assessment, Measurements, Dressing Treatment, Drainage, and Color\",         Kael Prevention initiated:  Yes   Wound Care Orders initiated:  Yes      [id number] nurse consulted for Pressure Injury (Stage 3,4, Unstageable, DTI, NWPT, and Complex wounds), New and Established Ostomies:  No      Nurse 1 eSignature: Electronically signed by Hussein Malhotra RN on 12/25/19 at 2:56 AM    **SHARE this note so that the co-signing nurse is able to place an eSignature**    Nurse 2 eSignature: {Esignature:[id number]}

## 2019-12-25 NOTE — ED NOTES
[facility]   Re: admit: Cholecystitis, hyponatremia, leukopenia  Dr. Prince Thorpe responsed @ [address]  12/24/19 0653

## 2019-12-25 NOTE — PROGRESS NOTES
Pt admitted to unit by giana by ER PCA. Pt in stable condition, VSS. Mother at bedside. PT nonverbal. Assessment completed and charted. 4 eyed skin assessment completed. Pt currently on 4L NC. PEG tube. Pt currently NPO. Stage two present on admission on pt R and L foot, gluteal cleft. Podus boots in place, no preventative on bottom pt incontinent of stool. Phan in place and patent. NS running at 100 mL/hr. Will continue to monitor.

## 2019-12-25 NOTE — PROGRESS NOTES
[facility] contacted to fax over current medication list and advance directive. Awaiting information to update pt's chart.

## 2019-12-25 NOTE — ED NOTES
Bedside report to C3 RN. Patient taken from department in stable condition.      Erika Fernandez RN  12/24/19 1275

## 2019-12-26 ENCOUNTER — APPOINTMENT (OUTPATIENT)
Dept: GENERAL RADIOLOGY | Age: 49
DRG: 444 | End: 2019-12-26
Payer: MEDICARE

## 2019-12-26 ENCOUNTER — APPOINTMENT (OUTPATIENT)
Dept: ULTRASOUND IMAGING | Age: 49
DRG: 444 | End: 2019-12-26
Payer: MEDICARE

## 2019-12-26 LAB
ALBUMIN SERPL-MCNC: 2.3 G/DL (ref 3.4–5)
ALP BLD-CCNC: 175 U/L (ref 40–129)
ALT SERPL-CCNC: 31 U/L (ref 10–40)
ANION GAP SERPL CALCULATED.3IONS-SCNC: 6 MMOL/L (ref 3–16)
AST SERPL-CCNC: 132 U/L (ref 15–37)
BASE EXCESS ARTERIAL: 10.1 MMOL/L (ref -3–3)
BASE EXCESS ARTERIAL: 7.4 MMOL/L (ref -3–3)
BASOPHILS ABSOLUTE: 0 K/UL (ref 0–0.2)
BASOPHILS RELATIVE PERCENT: 0.6 %
BILIRUB SERPL-MCNC: 5.1 MG/DL (ref 0–1)
BILIRUBIN DIRECT: 4.4 MG/DL (ref 0–0.3)
BILIRUBIN, INDIRECT: 0.7 MG/DL (ref 0–1)
BUN BLDV-MCNC: 10 MG/DL (ref 7–20)
CALCIUM SERPL-MCNC: 8.6 MG/DL (ref 8.3–10.6)
CARBOXYHEMOGLOBIN ARTERIAL: 1 % (ref 0–1.5)
CARBOXYHEMOGLOBIN ARTERIAL: 1.8 % (ref 0–1.5)
CHLORIDE BLD-SCNC: 94 MMOL/L (ref 99–110)
CO2: 33 MMOL/L (ref 21–32)
CREAT SERPL-MCNC: <0.5 MG/DL (ref 0.9–1.3)
D DIMER: 403 NG/ML DDU (ref 0–229)
EKG ATRIAL RATE: 126 BPM
EKG DIAGNOSIS: NORMAL
EKG P AXIS: 42 DEGREES
EKG P-R INTERVAL: 124 MS
EKG Q-T INTERVAL: 310 MS
EKG QRS DURATION: 74 MS
EKG QTC CALCULATION (BAZETT): 448 MS
EKG R AXIS: 115 DEGREES
EKG T AXIS: 20 DEGREES
EKG VENTRICULAR RATE: 126 BPM
EOSINOPHILS ABSOLUTE: 0 K/UL (ref 0–0.6)
EOSINOPHILS RELATIVE PERCENT: 0.1 %
FOLATE: >20 NG/ML (ref 4.78–24.2)
GFR AFRICAN AMERICAN: >60
GFR NON-AFRICAN AMERICAN: >60
GLUCOSE BLD-MCNC: 107 MG/DL (ref 70–99)
GLUCOSE BLD-MCNC: 111 MG/DL (ref 70–99)
GLUCOSE BLD-MCNC: 138 MG/DL (ref 70–99)
GLUCOSE BLD-MCNC: 146 MG/DL (ref 70–99)
GLUCOSE BLD-MCNC: 98 MG/DL (ref 70–99)
HCO3 ARTERIAL: 37 MMOL/L (ref 21–29)
HCO3 ARTERIAL: 39.4 MMOL/L (ref 21–29)
HCT VFR BLD CALC: 39.3 % (ref 40.5–52.5)
HEMOGLOBIN, ART, EXTENDED: 13.9 G/DL (ref 13.5–17.5)
HEMOGLOBIN, ART, EXTENDED: 14.7 G/DL (ref 13.5–17.5)
HEMOGLOBIN: 13.3 G/DL (ref 13.5–17.5)
LYMPHOCYTES ABSOLUTE: 1.3 K/UL (ref 1–5.1)
LYMPHOCYTES RELATIVE PERCENT: 34.1 %
MCH RBC QN AUTO: 36.6 PG (ref 26–34)
MCHC RBC AUTO-ENTMCNC: 34 G/DL (ref 31–36)
MCV RBC AUTO: 107.9 FL (ref 80–100)
METHEMOGLOBIN ARTERIAL: 0.5 %
METHEMOGLOBIN ARTERIAL: 0.6 %
MONOCYTES ABSOLUTE: 0.8 K/UL (ref 0–1.3)
MONOCYTES RELATIVE PERCENT: 20.8 %
NEUTROPHILS ABSOLUTE: 1.8 K/UL (ref 1.7–7.7)
NEUTROPHILS RELATIVE PERCENT: 44.4 %
O2 CONTENT ARTERIAL: 19 ML/DL
O2 CONTENT ARTERIAL: 20 ML/DL
O2 SAT, ARTERIAL: 97.9 %
O2 SAT, ARTERIAL: 99.1 %
O2 THERAPY: ABNORMAL
O2 THERAPY: ABNORMAL
PCO2 ARTERIAL: 75.6 MMHG (ref 35–45)
PCO2 ARTERIAL: 78.9 MMHG (ref 35–45)
PDW BLD-RTO: 13.9 % (ref 12.4–15.4)
PERFORMED ON: ABNORMAL
PH ARTERIAL: 7.29 (ref 7.35–7.45)
PH ARTERIAL: 7.33 (ref 7.35–7.45)
PHENOBARBITAL LEVEL: 30.8 UG/ML (ref 15–35)
PLATELET # BLD: 117 K/UL (ref 135–450)
PMV BLD AUTO: 7.7 FL (ref 5–10.5)
PO2 ARTERIAL: 175 MMHG (ref 75–108)
PO2 ARTERIAL: 97 MMHG (ref 75–108)
POTASSIUM SERPL-SCNC: 4.9 MMOL/L (ref 3.5–5.1)
RBC # BLD: 3.64 M/UL (ref 4.2–5.9)
SODIUM BLD-SCNC: 133 MMOL/L (ref 136–145)
T4 FREE: 1.2 NG/DL (ref 0.9–1.8)
TCO2 ARTERIAL: 39.4 MMOL/L
TCO2 ARTERIAL: 41.7 MMOL/L
TOTAL PROTEIN: 6.6 G/DL (ref 6.4–8.2)
TSH REFLEX: 4.77 UIU/ML (ref 0.27–4.2)
URINE CULTURE, ROUTINE: NORMAL
VITAMIN B-12: >2000 PG/ML (ref 211–911)
WBC # BLD: 3.9 K/UL (ref 4–11)

## 2019-12-26 PROCEDURE — 6360000002 HC RX W HCPCS: Performed by: INTERNAL MEDICINE

## 2019-12-26 PROCEDURE — 82607 VITAMIN B-12: CPT

## 2019-12-26 PROCEDURE — 2580000003 HC RX 258: Performed by: INTERNAL MEDICINE

## 2019-12-26 PROCEDURE — 85379 FIBRIN DEGRADATION QUANT: CPT

## 2019-12-26 PROCEDURE — 80048 BASIC METABOLIC PNL TOTAL CA: CPT

## 2019-12-26 PROCEDURE — 6370000000 HC RX 637 (ALT 250 FOR IP): Performed by: INTERNAL MEDICINE

## 2019-12-26 PROCEDURE — 76705 ECHO EXAM OF ABDOMEN: CPT

## 2019-12-26 PROCEDURE — 71045 X-RAY EXAM CHEST 1 VIEW: CPT

## 2019-12-26 PROCEDURE — 36600 WITHDRAWAL OF ARTERIAL BLOOD: CPT

## 2019-12-26 PROCEDURE — 2700000000 HC OXYGEN THERAPY PER DAY

## 2019-12-26 PROCEDURE — 94640 AIRWAY INHALATION TREATMENT: CPT

## 2019-12-26 PROCEDURE — 80076 HEPATIC FUNCTION PANEL: CPT

## 2019-12-26 PROCEDURE — 82803 BLOOD GASES ANY COMBINATION: CPT

## 2019-12-26 PROCEDURE — 99232 SBSQ HOSP IP/OBS MODERATE 35: CPT | Performed by: SURGERY

## 2019-12-26 PROCEDURE — 31720 CLEARANCE OF AIRWAYS: CPT

## 2019-12-26 PROCEDURE — 85025 COMPLETE CBC W/AUTO DIFF WBC: CPT

## 2019-12-26 PROCEDURE — 94761 N-INVAS EAR/PLS OXIMETRY MLT: CPT

## 2019-12-26 PROCEDURE — 82746 ASSAY OF FOLIC ACID SERUM: CPT

## 2019-12-26 PROCEDURE — 2060000000 HC ICU INTERMEDIATE R&B

## 2019-12-26 PROCEDURE — 2500000003 HC RX 250 WO HCPCS: Performed by: INTERNAL MEDICINE

## 2019-12-26 PROCEDURE — 99291 CRITICAL CARE FIRST HOUR: CPT | Performed by: INTERNAL MEDICINE

## 2019-12-26 PROCEDURE — 93010 ELECTROCARDIOGRAM REPORT: CPT | Performed by: INTERNAL MEDICINE

## 2019-12-26 PROCEDURE — 93005 ELECTROCARDIOGRAM TRACING: CPT | Performed by: NURSE PRACTITIONER

## 2019-12-26 PROCEDURE — 94660 CPAP INITIATION&MGMT: CPT

## 2019-12-26 PROCEDURE — 36415 COLL VENOUS BLD VENIPUNCTURE: CPT

## 2019-12-26 RX ORDER — SODIUM CHLORIDE FOR INHALATION 0.9 %
3 VIAL, NEBULIZER (ML) INHALATION 3 TIMES DAILY
Status: COMPLETED | OUTPATIENT
Start: 2019-12-26 | End: 2019-12-27

## 2019-12-26 RX ORDER — FUROSEMIDE 10 MG/ML
40 INJECTION INTRAMUSCULAR; INTRAVENOUS ONCE
Status: COMPLETED | OUTPATIENT
Start: 2019-12-26 | End: 2019-12-26

## 2019-12-26 RX ORDER — LEVALBUTEROL TARTRATE 45 UG/1
1 AEROSOL, METERED ORAL EVERY 6 HOURS PRN
Status: DISCONTINUED | OUTPATIENT
Start: 2019-12-26 | End: 2020-01-03 | Stop reason: HOSPADM

## 2019-12-26 RX ADMIN — Medication 10 ML: at 08:28

## 2019-12-26 RX ADMIN — VALPROIC ACID 500 MG: 250 SOLUTION ORAL at 08:26

## 2019-12-26 RX ADMIN — IPRATROPIUM BROMIDE 0.5 MG: 0.5 SOLUTION RESPIRATORY (INHALATION) at 20:53

## 2019-12-26 RX ADMIN — ENOXAPARIN SODIUM 40 MG: 40 INJECTION SUBCUTANEOUS at 08:27

## 2019-12-26 RX ADMIN — LEVOTHYROXINE SODIUM 88 MCG: 0.09 TABLET ORAL at 06:00

## 2019-12-26 RX ADMIN — FUROSEMIDE 40 MG: 10 INJECTION, SOLUTION INTRAMUSCULAR; INTRAVENOUS at 14:04

## 2019-12-26 RX ADMIN — ISODIUM CHLORIDE 3 ML: 0.03 SOLUTION RESPIRATORY (INHALATION) at 20:53

## 2019-12-26 RX ADMIN — IPRATROPIUM BROMIDE 0.5 MG: 0.5 SOLUTION RESPIRATORY (INHALATION) at 12:38

## 2019-12-26 RX ADMIN — VALPROIC ACID 500 MG: 250 SOLUTION ORAL at 14:04

## 2019-12-26 RX ADMIN — PIPERACILLIN AND TAZOBACTAM 3.38 G: 3; .375 INJECTION, POWDER, FOR SOLUTION INTRAVENOUS at 04:25

## 2019-12-26 RX ADMIN — IPRATROPIUM BROMIDE 0.5 MG: 0.5 SOLUTION RESPIRATORY (INHALATION) at 16:32

## 2019-12-26 RX ADMIN — VALPROIC ACID 500 MG: 250 SOLUTION ORAL at 20:44

## 2019-12-26 RX ADMIN — PIPERACILLIN AND TAZOBACTAM 3.38 G: 3; .375 INJECTION, POWDER, FOR SOLUTION INTRAVENOUS at 12:25

## 2019-12-26 RX ADMIN — PHENOBARBITAL 30 MG: 30 TABLET ORAL at 14:04

## 2019-12-26 RX ADMIN — PHENOBARBITAL 30 MG: 30 TABLET ORAL at 20:59

## 2019-12-26 RX ADMIN — PHENOBARBITAL 30 MG: 30 TABLET ORAL at 08:27

## 2019-12-26 RX ADMIN — MORPHINE SULFATE 2 MG: 2 INJECTION, SOLUTION INTRAMUSCULAR; INTRAVENOUS at 00:58

## 2019-12-26 RX ADMIN — PIPERACILLIN AND TAZOBACTAM 3.38 G: 3; .375 INJECTION, POWDER, FOR SOLUTION INTRAVENOUS at 20:43

## 2019-12-26 RX ADMIN — MORPHINE SULFATE 2 MG: 2 INJECTION, SOLUTION INTRAMUSCULAR; INTRAVENOUS at 06:29

## 2019-12-26 RX ADMIN — Medication 10 ML: at 20:45

## 2019-12-26 RX ADMIN — ISODIUM CHLORIDE 3 ML: 0.03 SOLUTION RESPIRATORY (INHALATION) at 13:42

## 2019-12-26 ASSESSMENT — PAIN SCALES - GENERAL
PAINLEVEL_OUTOF10: 0
PAINLEVEL_OUTOF10: 0

## 2019-12-26 NOTE — PROGRESS NOTES
Critical lab value PCO2 of 78.9, Dr. Miguel Angel Cooper notified via phone. Dr. Virgilio Kiser notified via perfect serve. Dr. Miguel Angel Cooper wants continuous Bipap and transferred to C4. Notified charge nurse for new room.

## 2019-12-26 NOTE — CONSULTS
Gastroenterology Consult Note    Patient:   Amaury Valencia   YOB: 1970   Facility:   [address] [place] [address]   Referring/PCP: Terri Lozada  Date:     12/26/2019  Consultant:   Ranjit Silveira MD    Subjective: This 44 y.o. male was admitted 12/24/2019 with a diagnosis of \"Acute cholecystitis [K81.0]  Acute cholecystitis [K81.0]\" and is seen in consultation regarding \"abd pain\". Information was obtained from interview of  the patient and the patient's mother, examination of the patient, and review of records. I did  update the past medical, surgical, social and / or family history. abd pain mild generalized for days assoc w elev LFT's and SOB    Current status  Present  Diet Order: Diet NPO Effective Now and he is tolerating diet. Recently, he has experienced no abdominal  Pain and he has not required Intravenous narcotic analgesics. The patient has also experienced no constipation, diarrhea, fever, hematochezia, melena, nausea and vomiting      Prior to Admission medications    Medication Sig Start Date End Date Taking? Authorizing Provider   acetaminophen (TYLENOL) 650 MG suppository Place 650 mg rectally every 4 hours as needed for Fever   Yes Historical Provider, MD   albuterol (PROVENTIL) (5 MG/ML) 0.5% nebulizer solution Take 2.5 mg by nebulization every 4 hours as needed for Wheezing   Yes Historical Provider, MD   aluminum & magnesium hydroxide-simethicone (MAALOX) 200-200-20 MG/5ML SUSP suspension Take 15 mLs by mouth daily   Yes Historical Provider, MD   bisacodyl (DULCOLAX) 10 MG suppository Place 10 mg rectally every other day Give every other evening. Can give every day prn   Yes Historical Provider, MD   PEDIATRIC MULTIPLE VIT-C-FA PO Take by mouth daily   Yes Historical Provider, MD   Orient Oil OIL 1 capsule by Gastrostomy Tube route 3 times daily   Yes Historical Provider, MD   docusate (COLACE) 50 MG/5ML liquid 100 mg by Per G Tube route 2 times daily   Yes Historical Provider, MD   polyethylene glycol (GLYCOLAX) packet 8.5 g by Per G Tube route daily   Yes Historical Provider, MD   LORazepam (ATIVAN) 0.5 MG tablet 0.5 mg by Per G Tube route as needed for Anxiety. Yes Historical Provider, MD   magnesium hydroxide (MILK OF MAGNESIA) 400 MG/5ML suspension Take 30 mLs by mouth daily as needed for Constipation   Yes Historical Provider, MD   MILK THISTLE PO 1 capsule by Gastric Tube route 2 times daily   Yes Historical Provider, MD   PHENobarbital (LUMINAL) 30 MG tablet 30 mg by Per G Tube route.  Give 30 mg tid on Sun-Tues-Thurs-Sat and 30 mg bid on Mon-Wed-Fri   Yes Historical Provider, MD   acetaminophen (Q-PAP CHILDRENS) 160 MG/5ML suspension by Per G Tube route every 4 hours as needed for Fever Give 20 cc every 4 hours prn   Yes Historical Provider, MD   levothyroxine (SYNTHROID) 88 MCG tablet 88 mcg by Per G Tube route Daily   Yes Historical Provider, MD   valproate (DEPAKENE) 250 MG/5ML solution 700-1,000 mg by Per G Tube route 3 times daily Takes 750 mg daily and 1000 mg bid   Yes Historical Provider, MD   dicyclomine (BENTYL) 20 MG tablet Take 20 mg by mouth every 8 hours as needed    Historical Provider, MD   loratadine (CLARITIN) 10 MG tablet 10 mg by Per G Tube route daily as needed    Historical Provider, MD   Ostomy Supplies (MOISTURE BARRIER SKIN) OINT by Does not apply route 2 times daily    Historical Provider, MD   promethazine (PHENERGAN) 25 MG suppository Place 25 mg rectally every 4 hours as needed for Nausea    Historical Provider, MD      Scheduled Medications:    ipratropium  0.5 mg Nebulization 4x daily    levothyroxine  88 mcg Per G Tube Daily    PHENobarbital  30 mg Per G Tube TID    valproate  500 mg Per G Tube TID    sodium chloride flush  10 mL Intravenous 2 times per day    enoxaparin  40 mg Subcutaneous Daily    piperacillin-tazobactam  3.375 g Intravenous Q8H     Infusions:   PRN Medications: levalbuterol, perflutren lipid microspheres, morphine, sodium chloride flush, magnesium hydroxide, ondansetron, potassium chloride **OR** potassium alternative oral replacement **OR** potassium chloride, magnesium sulfate, acetaminophen  Allergies: No Known Allergies    Past Medical History:   Diagnosis Date    Dysphagia     Gen idiopathic epilepsy, not intractable, 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 No pertinent surgical history. Social:   Social History     Tobacco Use    Smoking status: Never Smoker    Smokeless tobacco: Never Used   Substance Use Topics    Alcohol use: Not on file     Family: History reviewed. No pertinent family history. ROS: Pertinent items are noted in HPI.     Objective:   Vital Signs:  Temp (24hrs), Av.2 °F (36.8 °C), Min:97.8 °F (36.6 °C), Max:98.8 °F (69.4 °C)     Systolic (09SYJ), JJM:493 , Min:100 , ZBR:572      Diastolic (88MNF), OVZ:36, Min:66, Max:83     Pulse  Av.4  Min: 90  Max: 139  /66   Pulse 114   Temp 98.8 °F (37.1 °C) (Axillary)   Resp 14   Ht 4' 9\" (1.448 m)   Wt 125 lb (56.7 kg)   SpO2 95%   BMI 27.05 kg/m²      Physical Exam:   BP 99/67   Pulse 107   Temp 97.9 °F (36.6 °C) (Axillary)   Resp 16   Ht 4' 9\" (1.448 m)   Wt 125 lb (56.7 kg)   SpO2 99%   BMI 27.05 kg/m²   General appearance: alert and moderate distress  Lungs: clear to auscultation bilaterally  Chest wall: no tenderness  Heart: regular rate and rhythm, S1, S2 normal, no murmur, click, rub or gallop  Abdomen: soft, non-tender; bowel sounds normal; no masses,  no organomegaly  Extremities: extremities normal, atraumatic, no cyanosis or edema  Skin: Skin color, texture, turgor normal. No rashes or lesions  Neurologic: Non verbal but responsive    Lab and Imaging Review   Recent Labs     19  19419  0651 19  0859 19  0900   WBC 2.8*  --  2.9* --  3.9*   HGB 14.3  --  14.5  --  13.3*   .7*  --  106.7*  --  107.9*   *  --  102*  --  117*   * 127* 132* 133*  --    K 5.4* 4.7 4.4 4.9  --    CL 87* 86* 93* 94*  --    CO2 31 33* 31 33*  --    BUN 14 14 12 10  --    CREATININE <0.5* <0.5* <0.5* <0.5*  --    GLUCOSE 87 84 74 98  --    CALCIUM 8.7 8.7 8.5 8.6  --    PROT 6.8 6.6 6.7 6.6  --    LABALBU 2.2* 2.2* 2.3* 2.3*  --    * 74* 79* 132*  --    ALT 24 21 22 31  --    ALKPHOS 132* 137* 142* 175*  --    BILITOT 2.4* 2.6* 2.7* 5.1*  --    BILIDIR  --   --  2.0* 4.4*  --    AMMONIA 58  --   --   --   --    LIPASE 31.0  --   --   --   --        Assessment:     Patient Active Problem List    Diagnosis Date Noted    Acute cholecystitis 12/24/2019     45 yo w cerebral palsy and epilepsy and h/o pancreatitis admitted for abd pain and cholecystitis on CT. Is non verbal. Renaldo 5.1, AST//31, . Could very well have choledocholithiasis but is requiring continuous Cpap due to respiratory difficulties and is, therefore, too unstable for ERCP or even MRCP. Plan:   1. Supportive care  2. Needs MRCP and possible ERCP when stable enough, respiratory wise  3. Daily Labs  4.  Will follow    Alfonso Gilliam MD       O) [phone]

## 2019-12-26 NOTE — PROGRESS NOTES
Pts orientation is HALINA d/t him being nonverbal- he is alert. VSS. Assessment as charted. - Pt has a contracture of the R side and clubbed feet d/t spastic diplegic cerebral palsy. He does have two stage II wounds on his feet with floating casts applied as well as a stage II vs fissure on his gluteal cleft. - Pt has unlabored, symmetrical breathing with clear and diminished lung sounds. - Pt did look agitated and I rated his pain as a 7 on the FLACC scale- PRN Morphine was given per MD order.   - Pt has trace edema in BLE.  - Pt has ladd in place with adequate sam output. Pt is currently resting in his bed that is locked and in its lowest position with his call light within reach. Will continue to monitor.

## 2019-12-26 NOTE — PROGRESS NOTES
Report given to [facility] at bedside. Iglesia Good RN to help to transport patient to .

## 2019-12-26 NOTE — PROGRESS NOTES
Hospitalist Progress Note      PCP: Maximus Griggs    Date of Admission: 12/24/2019    Chief Complaint: Abdominal pain     Hospital Course: The patient is a 44 y.o. male who presents to [facility] ([facility]) with acute onset and progressive course of abdominal pain. Pt is non verbal hx obtained from family and records. Per family pt. With increasing groans over the last few days pt. With g tube and npo today with diarrhea no fever chills vomiting hx of similar episode one year ago and was dx with pancreatitis     Subjective:   Throat congestion, a lot of secretions in the throat and worsening hypoxia overnight  He had pharyngeal suction overnight  Currently he is on 6 L oxygen still has lot of chest congestion  No fever nausea or vomiting. PEG tube is on hold, IV fluids were discontinued overnight      Medications:  Reviewed    Infusion Medications     Scheduled Medications    ipratropium  0.5 mg Nebulization 4x daily    levothyroxine  88 mcg Per G Tube Daily    PHENobarbital  30 mg Per G Tube TID    valproate  500 mg Per G Tube TID    sodium chloride flush  10 mL Intravenous 2 times per day    enoxaparin  40 mg Subcutaneous Daily    piperacillin-tazobactam  3.375 g Intravenous Q8H     PRN Meds: levalbuterol, perflutren lipid microspheres, morphine, sodium chloride flush, magnesium hydroxide, ondansetron, potassium chloride **OR** potassium alternative oral replacement **OR** potassium chloride, magnesium sulfate, acetaminophen      Intake/Output Summary (Last 24 hours) at 12/26/2019 1205  Last data filed at 12/25/2019 2205  Gross per 24 hour   Intake 0 ml   Output 910 ml   Net -910 ml       Physical Exam Performed:    /66   Pulse 114   Temp 98.8 °F (37.1 °C) (Axillary)   Resp 14   Ht 4' 9\" (1.448 m)   Wt 125 lb (56.7 kg)   SpO2 95%   BMI 27.05 kg/m²     General appearance: Quadriparesis, appears ill, on oxygen  HEENT: Pupils equal, round, and reactive to light.  Conjunctivae/corneas

## 2019-12-26 NOTE — CARE COORDINATION
Attempt to assess. Patient now on continuous BIPAP. May need intubation. Will defer assessment till stabilizes.  Stacy Denise RN

## 2019-12-26 NOTE — PROGRESS NOTES
I walked into pts room approx 0230 and pt was very pale- SpO2 was in the 60s. A non re-breather 15L  Was placed on pt and his SpO2 silva into the 90s but the pt sounded very garbled in his throat. RT was notified and she did naso-suctioning and got a large amount of sputum out. Pts lung sounds were crackled and Mee Sheets, NP was notified. She ordered a STAT EKG and CXR. Still awaiting results.

## 2019-12-26 NOTE — PROGRESS NOTES
Pt alert, responsive to voice, nonverbal. Moaning, restless, FLACC score 6; PRN morphine given for pain; PRN zofran given for nausea; see MAR for admin. Ladd cath with clear, sam urine in ladd cath; small amt of bloody drainage from meatus. Poss stage II on feet; podus boots utilized. Stage II vs fissure on coccyx; no mepilex d/t incont of stool. Q2 turn with wedge pillow utilized. Meds given through G tube without difficulty; Flushed with 60mL water; pt tolerated well; HOB 35 degrees. Call light within reach. Bed side table within reach. Wheels locked. Bed in lowest position. Bed check in place. Pt instructed to call out for assistance; no evidence of learning noted; increase rounding frequency. Shift assessment complete. All care cont per orders. Will cont to monitor. Pt's mother at bedside.  Electronically signed by Glenda Brown RN on 12/25/2019 at 8:05 PM

## 2019-12-26 NOTE — PROGRESS NOTES
[facility]    PATIENT NAME: Shayla Sheppard     TODAY'S DATE: 12/26/2019    CHIEF COMPLAINT: none    INTERVAL HISTORY/HPI:    Pt remains non-verbal, currently resting comfortably. Discussed status w/ his mom . REVIEW OF SYSTEMS:  Pertinent positives and negatives as per interval history section    OBJECTIVE:  VITALS:  /66   Pulse 114   Temp 98.8 °F (37.1 °C) (Axillary)   Resp 14   Ht 4' 9\" (1.448 m)   Wt 125 lb (56.7 kg)   SpO2 95%   BMI 27.05 kg/m²     INTAKE/OUTPUT:    I/O last 3 completed shifts: In: 1844 [I.V.:1158]  Out: 910 [Urine:910]  No intake/output data recorded. CONSTITUTIONAL:  fatigued and somnolent  LUNGS:  Respirations easy and unlabored,    CARD:  regular rate and rhythm  ABDOMEN:   , soft, non-distended, non-tender     Data:  CBC:   Recent Labs     12/24/19 1942 12/25/19 0651 12/26/19  0900   WBC 2.8* 2.9* 3.9*   HGB 14.3 14.5 13.3*   HCT 41.1 42.6 39.3*   * 102* 117*     BMP:    Recent Labs     12/24/19 1949 12/25/19  0651 12/26/19  0859   * 132* 133*   K 4.7 4.4 4.9   CL 86* 93* 94*   CO2 33* 31 33*   BUN 14 12 10   CREATININE <0.5* <0.5* <0.5*   GLUCOSE 84 74 98     Hepatic:   Recent Labs     12/24/19 1949 12/25/19  0651 12/26/19  0859   AST 74* 79* 132*   ALT 21 22 31   BILITOT 2.6* 2.7* 5.1*   ALKPHOS 137* 142* 175*     Mag:    No results for input(s): MG in the last 72 hours. Phos:   No results for input(s): PHOS in the last 72 hours. INR: No results for input(s): INR in the last 72 hours. Radiology Review:  *Imaging personally reviewed by me.       RIGHT UPPER QUADRANT ULTRASOUND       12/26/2019 6:55 am       COMPARISON:   12/24/2019       HISTORY:   ORDERING SYSTEM PROVIDED HISTORY: abdominal pain, possible cholecystitis by CT   TECHNOLOGIST PROVIDED HISTORY:   Reason for exam:->abdominal pain, possible cholecystitis by CT       FINDINGS:   LIVER:  The liver is normal in size and echotexture.  There is no ductal   dilatation or mass.

## 2019-12-26 NOTE — PROGRESS NOTES
Shift assessment completed. Pt's orientation is HALINA, alert but nonverbal. VSS. Pt has contracture to R side, no BP or sticks on R side. Pt has coarse crackles throughout lung bases, on 6 Lit O2 at 95%. Phan catheter in place draining clear, sam colored urine. Denies any needs at this time. Bed locked and in lowest position. Call light and bedside table within reach. Will continue to monitor.

## 2019-12-26 NOTE — PROGRESS NOTES
Pt's mother called this RN, notified mother of bipap starting and transfer of pt to another floor. Will call back to notify mother of room number once available.

## 2019-12-26 NOTE — PROGRESS NOTES
12/26/19 1613   Oxygen Therapy/Pulse Ox   O2 Therapy Oxygen   O2 Device PAP (positive airway pressure)   FiO2  50 %   SpO2 92 %   Blood Gas  Performed? Yes   $ABG $ABG   Blayne's Test #1 Pos   Site #1 Left Radial   Site Prepped #1 Yes   Number of Attempts #1 2   Pressure Held #1 Yes   Complications #1 None   Post-procedure #1 Standard   Specimen Status #1 To lab   How Tolerated?  Tolerated well

## 2019-12-26 NOTE — CONSULTS
Pulmonary & Critical Care Consultation Note   Saintclair Gay, MD    REASONFOR CONSULTATION/CC: acute resp failure    Consult at request of  Marleny Castellano MD  PCP: Hansa Jordan    HISTORYOF PRESENT ILLNESS:    44y.o. year old male with chronic dysphagia and cognitive impairment, he has an indwelling G tube for the past year and is NPO otherwise. The patient is nonverbal and direct history is unobtainable. He had developed increasing groan sounds over the past week the family became concerned about and brought him into the ED for evaluation. There was concern about this indicating abdominal pain, he had workup that identified possible cholecystitis/biliary obstruction with rising bilirubin levels. Surgery and GI and involved in his care. Over the past 12 hours he has developed progressively worsening respiratory failure with rising oxygen requirements. Has had thick yellow secretions being NT suctioned. When seen this afternoon he was on a nonrebreather mask with rapid respirations. Past Medical History:   Diagnosis Date    Dysphagia     Gen idiopathic epilepsy, not intractable, w/o stat epi ([facility])     Generalized anxiety disorder     Spastic diplegic cerebral palsy ([facility])     Unspecified intellectual disabilities        History reviewed. No pertinent surgical history. family history is not on file.     Social History     Tobacco Use    Smoking status: Never Smoker    Smokeless tobacco: Never Used   Substance Use Topics    Alcohol use: Not on file        Scheduled Meds:   ipratropium  0.5 mg Nebulization 4x daily    furosemide  40 mg Intravenous Once    sodium chloride nebulizer  3 mL Nebulization TID    levothyroxine  88 mcg Per G Tube Daily    PHENobarbital  30 mg Per G Tube TID    valproate  500 mg Per G Tube TID    sodium chloride flush  10 mL Intravenous 2 times per day    enoxaparin  40 mg Subcutaneous Daily    piperacillin-tazobactam  3.375 g Intravenous Q8H       Continuous Infusions:      PRN Meds:   levalbuterol, perflutren lipid microspheres, morphine, sodium chloride flush, magnesium hydroxide, ondansetron, potassium chloride **OR** potassium alternative oral replacement **OR** potassium chloride, magnesium sulfate, acetaminophen   Consults  ALLERGIES:  Patient has No Known Allergies. REVIEW OF SYSTEMS:  Review of Systems    Objective:   PHYSICAL EXAM:  /66   Pulse 114   Temp 98.8 °F (37.1 °C) (Axillary)   Resp 14   Ht 4' 9\" (1.448 m)   Wt 125 lb (56.7 kg)   SpO2 95%   BMI 27.05 kg/m²    Physical Exam  Constitutional:       General: He is in acute distress. Appearance: He is well-developed. He is not diaphoretic. HENT:      Head: Normocephalic and atraumatic. Mouth/Throat:      Pharynx: No oropharyngeal exudate. Neck:      Musculoskeletal: Neck supple. Vascular: No JVD. Cardiovascular:      Heart sounds: No murmur. No friction rub. No gallop. Pulmonary:      Effort: Respiratory distress present. Breath sounds: Rhonchi and rales present. No wheezing. Abdominal:      General: Bowel sounds are normal. There is no distension. Palpations: Abdomen is soft. Tenderness: There is no tenderness. Lymphadenopathy:      Cervical: No cervical adenopathy. Skin:     General: Skin is warm and dry. Findings: No rash. Neurological:      Mental Status: He is alert. Cranial Nerves: No cranial nerve deficit.       Comments: nonverbal            Data Reviewed:   LABS:  CBC:   Recent Labs     12/24/19 1942 12/25/19 0651 12/26/19  0900   WBC 2.8* 2.9* 3.9*   HGB 14.3 14.5 13.3*   HCT 41.1 42.6 39.3*   .7* 106.7* 107.9*   * 102* 117*     BMP:   Recent Labs     12/24/19 1949 12/25/19 0651 12/26/19  0859   * 132* 133*   K 4.7 4.4 4.9   CL 86* 93* 94*   CO2 33* 31 33*   BUN 14 12 10   CREATININE <0.5* <0.5* <0.5*     LIVER PROFILE:   Recent Labs     12/24/19 1942 12/24/19 1949 12/25/19 0651 12/26/19  0859   AST 115* 74* 79* 132*   ALT 24 21 22 31   LIPASE 31.0  --   --   --    BILIDIR  --   --  2.0* 4.4*   BILITOT 2.4* 2.6* 2.7* 5.1*   ALKPHOS 132* 137* 142* 175*     PT/INR: No results for input(s): PROTIME, INR in the last 72 hours. APTT:No results for input(s): APTT in the last 72 hours. UA:  Recent Labs     12/24/19  2102   COLORU Yellow   PHUR 5.5   WBCUA 3-5   RBCUA 20-50*   CLARITYU Clear   SPECGRAV 1.015   LEUKOCYTESUR Negative   UROBILINOGEN 2.0*   BILIRUBINUR MODERATE*   BLOODU LARGE*   GLUCOSEU 100*   AMORPHOUS Rare*     No results for input(s): PHART, MBQ3NPN, PO2ART in the last 72 hours. Lung windows of CT abd and CXR personally reviewed, kyphoscoliosis with reduced lung volumes. No obvious focal airspace disease/infiltrate           Assessment:     1. Acute on chronic resp failure, hypoxic   -acute pulm edema vs mucous plugging   -restrictive lung disease/kyphoscoliosis  2. Elevated bilirubin, leukopenia/sepsis   -?obstructive jaundice/cholangitis  3. Chronic cognitive impairment, nonverbal   -cerebral palsy  4. Chronic dysphagia with G tube    Plan:      -Check ABG  -BiPAP support, transfer to higher level of care. If not improving may need intubation   -Escalate pulm toileting with nebs including saline  -Lasix x1, IVF were stopped.  Monitor hemodynamics and renal function  -Will tentatively plan for bronchoscopy tomorrow  -Hold tube feeds  -GI/Surgery following  -On empiric atb with Zosyn for intra-abdominal coverage     Due to life threatening respiratory failure this patient is critically ill, total critical care time involved in his care was 35 mins     Russell Salas MD

## 2019-12-27 ENCOUNTER — APPOINTMENT (OUTPATIENT)
Dept: GENERAL RADIOLOGY | Age: 49
DRG: 444 | End: 2019-12-27
Payer: MEDICARE

## 2019-12-27 LAB
A/G RATIO: 0.5 (ref 1.1–2.2)
ALBUMIN SERPL-MCNC: 2.1 G/DL (ref 3.4–5)
ALP BLD-CCNC: 159 U/L (ref 40–129)
ALT SERPL-CCNC: 31 U/L (ref 10–40)
ANION GAP SERPL CALCULATED.3IONS-SCNC: 8 MMOL/L (ref 3–16)
AST SERPL-CCNC: 117 U/L (ref 15–37)
BASE EXCESS ARTERIAL: 8.7 MMOL/L (ref -3–3)
BILIRUB SERPL-MCNC: 3.4 MG/DL (ref 0–1)
BUN BLDV-MCNC: 14 MG/DL (ref 7–20)
CALCIUM SERPL-MCNC: 8.5 MG/DL (ref 8.3–10.6)
CARBOXYHEMOGLOBIN ARTERIAL: 1.2 % (ref 0–1.5)
CHLORIDE BLD-SCNC: 89 MMOL/L (ref 99–110)
CO2: 40 MMOL/L (ref 21–32)
CREAT SERPL-MCNC: <0.5 MG/DL (ref 0.9–1.3)
GFR AFRICAN AMERICAN: >60
GFR NON-AFRICAN AMERICAN: >60
GLOBULIN: 4.5 G/DL
GLUCOSE BLD-MCNC: 100 MG/DL (ref 70–99)
GLUCOSE BLD-MCNC: 115 MG/DL (ref 70–99)
GLUCOSE BLD-MCNC: 117 MG/DL (ref 70–99)
GLUCOSE BLD-MCNC: 151 MG/DL (ref 70–99)
GLUCOSE BLD-MCNC: 164 MG/DL (ref 70–99)
HCO3 ARTERIAL: 36.8 MMOL/L (ref 21–29)
HCT VFR BLD CALC: 37 % (ref 40.5–52.5)
HEMOGLOBIN, ART, EXTENDED: 13.7 G/DL (ref 13.5–17.5)
HEMOGLOBIN: 12.7 G/DL (ref 13.5–17.5)
MCH RBC QN AUTO: 36.8 PG (ref 26–34)
MCHC RBC AUTO-ENTMCNC: 34.2 G/DL (ref 31–36)
MCV RBC AUTO: 107.5 FL (ref 80–100)
METHEMOGLOBIN ARTERIAL: 0.4 %
O2 CONTENT ARTERIAL: 19 ML/DL
O2 SAT, ARTERIAL: 99.2 %
O2 THERAPY: ABNORMAL
PCO2 ARTERIAL: 67.2 MMHG (ref 35–45)
PDW BLD-RTO: 13.7 % (ref 12.4–15.4)
PERFORMED ON: ABNORMAL
PH ARTERIAL: 7.36 (ref 7.35–7.45)
PLATELET # BLD: 104 K/UL (ref 135–450)
PMV BLD AUTO: 8.1 FL (ref 5–10.5)
PO2 ARTERIAL: 161.4 MMHG (ref 75–108)
POTASSIUM REFLEX MAGNESIUM: 4 MMOL/L (ref 3.5–5.1)
RBC # BLD: 3.44 M/UL (ref 4.2–5.9)
SODIUM BLD-SCNC: 137 MMOL/L (ref 136–145)
TCO2 ARTERIAL: 38.8 MMOL/L
TOTAL PROTEIN: 6.6 G/DL (ref 6.4–8.2)
WBC # BLD: 5.1 K/UL (ref 4–11)

## 2019-12-27 PROCEDURE — 99231 SBSQ HOSP IP/OBS SF/LOW 25: CPT | Performed by: SURGERY

## 2019-12-27 PROCEDURE — 6360000002 HC RX W HCPCS: Performed by: INTERNAL MEDICINE

## 2019-12-27 PROCEDURE — 94660 CPAP INITIATION&MGMT: CPT

## 2019-12-27 PROCEDURE — 36415 COLL VENOUS BLD VENIPUNCTURE: CPT

## 2019-12-27 PROCEDURE — 94640 AIRWAY INHALATION TREATMENT: CPT

## 2019-12-27 PROCEDURE — APPSS45 APP SPLIT SHARED TIME 31-45 MINUTES: Performed by: CLINICAL NURSE SPECIALIST

## 2019-12-27 PROCEDURE — 6370000000 HC RX 637 (ALT 250 FOR IP): Performed by: INTERNAL MEDICINE

## 2019-12-27 PROCEDURE — 99233 SBSQ HOSP IP/OBS HIGH 50: CPT | Performed by: INTERNAL MEDICINE

## 2019-12-27 PROCEDURE — 36600 WITHDRAWAL OF ARTERIAL BLOOD: CPT

## 2019-12-27 PROCEDURE — 82803 BLOOD GASES ANY COMBINATION: CPT

## 2019-12-27 PROCEDURE — 2580000003 HC RX 258: Performed by: INTERNAL MEDICINE

## 2019-12-27 PROCEDURE — 85027 COMPLETE CBC AUTOMATED: CPT

## 2019-12-27 PROCEDURE — 94761 N-INVAS EAR/PLS OXIMETRY MLT: CPT

## 2019-12-27 PROCEDURE — 80053 COMPREHEN METABOLIC PANEL: CPT

## 2019-12-27 PROCEDURE — 71045 X-RAY EXAM CHEST 1 VIEW: CPT

## 2019-12-27 PROCEDURE — 2060000000 HC ICU INTERMEDIATE R&B

## 2019-12-27 PROCEDURE — 2700000000 HC OXYGEN THERAPY PER DAY

## 2019-12-27 RX ADMIN — PIPERACILLIN AND TAZOBACTAM 3.38 G: 3; .375 INJECTION, POWDER, FOR SOLUTION INTRAVENOUS at 04:33

## 2019-12-27 RX ADMIN — PIPERACILLIN AND TAZOBACTAM 3.38 G: 3; .375 INJECTION, POWDER, FOR SOLUTION INTRAVENOUS at 21:55

## 2019-12-27 RX ADMIN — LEVOTHYROXINE SODIUM 88 MCG: 0.09 TABLET ORAL at 10:40

## 2019-12-27 RX ADMIN — Medication 10 ML: at 10:34

## 2019-12-27 RX ADMIN — VALPROIC ACID 500 MG: 250 SOLUTION ORAL at 21:55

## 2019-12-27 RX ADMIN — ISODIUM CHLORIDE 3 ML: 0.03 SOLUTION RESPIRATORY (INHALATION) at 08:22

## 2019-12-27 RX ADMIN — Medication 10 ML: at 21:55

## 2019-12-27 RX ADMIN — ENOXAPARIN SODIUM 40 MG: 40 INJECTION SUBCUTANEOUS at 10:33

## 2019-12-27 RX ADMIN — PHENOBARBITAL 30 MG: 30 TABLET ORAL at 10:33

## 2019-12-27 RX ADMIN — VALPROIC ACID 500 MG: 250 SOLUTION ORAL at 14:23

## 2019-12-27 RX ADMIN — PHENOBARBITAL 30 MG: 30 TABLET ORAL at 21:55

## 2019-12-27 RX ADMIN — MORPHINE SULFATE 2 MG: 2 INJECTION, SOLUTION INTRAMUSCULAR; INTRAVENOUS at 16:54

## 2019-12-27 RX ADMIN — ACETAMINOPHEN 650 MG: 650 SUPPOSITORY RECTAL at 22:03

## 2019-12-27 RX ADMIN — IPRATROPIUM BROMIDE 0.5 MG: 0.5 SOLUTION RESPIRATORY (INHALATION) at 16:43

## 2019-12-27 RX ADMIN — PHENOBARBITAL 30 MG: 30 TABLET ORAL at 14:23

## 2019-12-27 RX ADMIN — IPRATROPIUM BROMIDE 0.5 MG: 0.5 SOLUTION RESPIRATORY (INHALATION) at 08:22

## 2019-12-27 RX ADMIN — VALPROIC ACID 500 MG: 250 SOLUTION ORAL at 10:33

## 2019-12-27 RX ADMIN — IPRATROPIUM BROMIDE 0.5 MG: 0.5 SOLUTION RESPIRATORY (INHALATION) at 11:41

## 2019-12-27 RX ADMIN — PIPERACILLIN AND TAZOBACTAM 3.38 G: 3; .375 INJECTION, POWDER, FOR SOLUTION INTRAVENOUS at 13:11

## 2019-12-27 ASSESSMENT — PAIN SCALES - GENERAL
PAINLEVEL_OUTOF10: 10
PAINLEVEL_OUTOF10: 6
PAINLEVEL_OUTOF10: 0

## 2019-12-27 NOTE — PROGRESS NOTES
gallops. Abdomen: firm , ?-tender, non-distended with normal bowel sounds. Musculoskeletal: No clubbing, cyanosis or edema bilaterally. Skin: Skin color, texture, turgor normal.  No rashes or lesions. Neurologic: Quadriparesis, nonverbal.  Psychiatric: awake  Capillary Refill: Brisk,< 3 seconds   Peripheral Pulses: +2 palpable,  Labs:   Recent Labs     12/25/19  0651 12/26/19  0900 12/27/19  1109   WBC 2.9* 3.9* 5.1   HGB 14.5 13.3* 12.7*   HCT 42.6 39.3* 37.0*   * 117* 104*     Recent Labs     12/25/19  0651 12/26/19  0859 12/27/19  1109   * 133* 137   K 4.4 4.9 4.0   CL 93* 94* 89*   CO2 31 33* 40*   BUN 12 10 14   CREATININE <0.5* <0.5* <0.5*   CALCIUM 8.5 8.6 8.5     Recent Labs     12/25/19  0651 12/26/19  0859 12/27/19  1109   AST 79* 132* 117*   ALT 22 31 31   BILIDIR 2.0* 4.4*  --    BILITOT 2.7* 5.1* 3.4*   ALKPHOS 142* 175* 159*     No results for input(s): INR in the last 72 hours. No results for input(s): Conrad Pruitt in the last 72 hours. Urinalysis:      Lab Results   Component Value Date    NITRU Negative 12/24/2019    WBCUA 3-5 12/24/2019    RBCUA 20-50 12/24/2019    BLOODU LARGE 12/24/2019    SPECGRAV 1.015 12/24/2019    GLUCOSEU 100 12/24/2019    GLUCOSEU Negative 12/24/2009       Radiology:  XR CHEST PORTABLE   Final Result   Mild right basilar atelectasis. Mild pneumonia is a differential   consideration. US GALLBLADDER RUQ   Final Result   1. Gallbladder sludge. XR CHEST PORTABLE   Final Result   Bibasilar and possibly left upper lobe atelectasis and/or pneumonia. CT ABDOMEN PELVIS W IV CONTRAST Additional Contrast? None   Final Result   Acute cholecystitis is suspected. XR CHEST PORTABLE   Final Result   Negative low lung volume portable examination.                  Assessment/Plan:    Active Hospital Problems    Diagnosis    Acute cholecystitis [K81.0]       Acute cholecystitis-per CT abdomen, ultrasound of the gallbladder revealed sludge -with abdominal pain-morphine 2 mg IV every 4 as needed, continue Zosyn and surgical input appreciated,   Uptrending total bilirubin-rule out obstruction-GI eval     Acute hypoxic respiratory failure-possible fluid overload aspiration pneumonitis cannot be ruled out  bipap-breathing treatment, continue n.p.o. gentle pharyngeal suction, chest x-ray and pulmonology evaluation, getting Zosyn IV      Cerebral palsy/quadriparesis /aphasia seizures-on phenobarbital and valproic acid-get levels        Blood sugar was 79 this morning-asymptomatic monitor D50 as needed        Hyponatremia-improved with normal saline     Macrocytosis-TSH F45 and folic acid, TSH is mildly elevated free T4 is normal        Microscopic hematuria-May repeat and follow-up as an outpatient     Thrombocytopenia-closely    DVT Prophylaxis: lovenox  Diet: Diet NPO Effective Now  Code Status: DNR-CCA        Dispo - inpt.     Nelda Merchant MD

## 2019-12-27 NOTE — PROGRESS NOTES
12/26/19  0859   * 132* 133*   K 4.7 4.4 4.9   CL 86* 93* 94*   CO2 33* 31 33*   BUN 14 12 10   CREATININE <0.5* <0.5* <0.5*     LIVER PROFILE:   Recent Labs     12/24/19 1942 12/24/19 1949 12/25/19  0651 12/26/19  0859   * 74* 79* 132*   ALT 24 21 22 31   LIPASE 31.0  --   --   --    BILIDIR  --   --  2.0* 4.4*   BILITOT 2.4* 2.6* 2.7* 5.1*   ALKPHOS 132* 137* 142* 175*     UA:  Recent Labs     12/24/19  2102   COLORU Yellow   PHUR 5.5   WBCUA 3-5   RBCUA 20-50*   CLARITYU Clear   SPECGRAV 1.015   LEUKOCYTESUR Negative   UROBILINOGEN 2.0*   BILIRUBINUR MODERATE*   BLOODU LARGE*   GLUCOSEU 100*   AMORPHOUS Rare*       Imaging:  I have reviewed radiology images personally. XR CHEST PORTABLE   Final Result   Mild right basilar atelectasis. Mild pneumonia is a differential   consideration. US GALLBLADDER RUQ   Final Result   1. Gallbladder sludge. XR CHEST PORTABLE   Final Result   Bibasilar and possibly left upper lobe atelectasis and/or pneumonia. CT ABDOMEN PELVIS W IV CONTRAST Additional Contrast? None   Final Result   Acute cholecystitis is suspected. XR CHEST PORTABLE   Final Result   Negative low lung volume portable examination. Ct Abdomen Pelvis W Iv Contrast Additional Contrast? None    Result Date: 12/24/2019  EXAMINATION: CT OF THE ABDOMEN AND PELVIS WITH CONTRAST 12/24/2019 9:16 pm TECHNIQUE: CT of the abdomen and pelvis was performed with the administration of intravenous contrast. Multiplanar reformatted images are provided for review. Dose modulation, iterative reconstruction, and/or weight based adjustment of the mA/kV was utilized to reduce the radiation dose to as low as reasonably achievable.  COMPARISON: 12/24/2009 HISTORY: ORDERING SYSTEM PROVIDED HISTORY: abd pain, gtube present TECHNOLOGIST PROVIDED HISTORY: Reason for exam:->abd pain, gtube present Additional Contrast?->None Reason for Exam: abd pain, hx  pancreatitis recently Acuity: Acute Type of Exam: Initial Relevant Medical/Surgical History: pt nonverbal hx from family FINDINGS: Lower Chest: There is bibasilar atelectasis. Organs: Low-attenuation of the liver may represent fatty infiltration. There is no focal liver lesion. There appears to be mucosal enhancement of the gallbladder with minimal adjacent inflammatory stranding. No definite cholelithiasis is identified. There is no biliary ductal dilation. The spleen, pancreas, adrenal glands and kidneys are unremarkable. GI/Bowel: Percutaneous gastrostomy tube is in place. Small bowel caliber is normal.  The appendix is normal.  Fatty infiltration in the wall of the cecum and ascending colon is nonspecific though may indicate chronic inflammation. Pelvis: There is a catheter in the urinary bladder. Peritoneum/Retroperitoneum: There is trace fluid in the right paracolic gutter with minimal right upper quadrant fat stranding. There is no free fluid in the pelvis and there is no adenopathy. Aortic caliber is normal without dissection. Bones/Soft Tissues: No acute findings. Severe scoliosis deformity and chronic deformity of the hips is again identified. Spinal fixation rods are also again noted. Acute cholecystitis is suspected. Us Gallbladder Ruq    Result Date: 12/26/2019  EXAMINATION: RIGHT UPPER QUADRANT ULTRASOUND 12/26/2019 6:55 am COMPARISON: 12/24/2019 HISTORY: ORDERING SYSTEM PROVIDED HISTORY: abdominal pain, possible cholecystitis by CT TECHNOLOGIST PROVIDED HISTORY: Reason for exam:->abdominal pain, possible cholecystitis by CT FINDINGS: LIVER:  The liver is normal in size and echotexture. There is no ductal dilatation or mass. BILIARY SYSTEM: Sludge is present within the lumen of the gallbladder. The gallbladder wall is within normal limits. The common bile duct is not visualized. RIGHT KIDNEY: The right kidney is unremarkable measuring 9.7 cm. There is no renal mass or hydronephrosis.  PANCREAS:  The pancreas is

## 2019-12-27 NOTE — PROGRESS NOTES
12/27/19 0815   Oxygen Therapy/Pulse Ox   O2 Therapy Oxygen   $Oxygen $Daily Charge   O2 Device PAP (positive airway pressure)   FiO2  50 %   Resp 17   SpO2 98 %   $Pulse Oximeter $Spot check (multiple/continuous)   $ABG $ABG   Blayne's Test #1 Pos   Site #1 Left Radial   Site Prepped #1 Yes   Number of Attempts #1 1   Pressure Held #1 Yes   Complications #1 None   Post-procedure #1 Standard   Specimen Status #1 To lab   How Tolerated?  Tolerated well

## 2019-12-27 NOTE — PROGRESS NOTES
Adalberto Rubin is a 44 y.o. male patient.     Current Facility-Administered Medications   Medication Dose Route Frequency Provider Last Rate Last Dose    levalbuterol (XOPENEX HFA) inhaler 1 puff  1 puff Inhalation Q6H PRN Gerry Montoya MD        ipratropium (ATROVENT) 0.02 % nebulizer solution 0.5 mg  0.5 mg Nebulization 4x daily Gerry Montoya MD   0.5 mg at 12/26/19 2053    perflutren lipid microspheres (DEFINITY) injection 1.65 mg  1.5 mL Intravenous ONCE PRN Gerry Montoya MD        sodium chloride nebulizer 0.9 % solution 3 mL  3 mL Nebulization TID Anu Gabriel MD   3 mL at 12/26/19 2053    morphine (PF) injection 2 mg  2 mg Intravenous Q4H PRN Gerry Montoya MD   2 mg at 12/26/19 7115    levothyroxine (SYNTHROID) tablet 88 mcg  88 mcg Per G Tube Daily Primo Leslie MD   88 mcg at 12/26/19 0600    PHENobarbital (LUMINAL) tablet 30 mg  30 mg Per G Tube TID Primo Leslie MD   30 mg at 12/26/19 2059    valproate (DEPAKENE) 250 MG/5ML solution 500 mg  500 mg Per G Tube TID Primo Leslie MD   500 mg at 12/26/19 2044    sodium chloride flush 0.9 % injection 10 mL  10 mL Intravenous 2 times per day Primo Leslie MD   10 mL at 12/26/19 2045    sodium chloride flush 0.9 % injection 10 mL  10 mL Intravenous PRN Gracie Vogt MD        magnesium hydroxide (MILK OF MAGNESIA) 400 MG/5ML suspension 30 mL  30 mL Oral Daily PRN Gracie Vogt MD        ondansetron [facility]) injection 4 mg  4 mg Intravenous Q6H PRN Primo Leslie MD   4 mg at 12/25/19 2102    enoxaparin (LOVENOX) injection 40 mg  40 mg Subcutaneous Daily Gracie Vogt MD   40 mg at 12/26/19 0827    potassium chloride (KLOR-CON M) extended release tablet 40 mEq  40 mEq Oral PRN Gracie Vogt MD        Or    potassium bicarb-citric acid (EFFER-K) effervescent tablet 40 mEq  40 mEq Oral PRN Gracie Vogt MD        Or    potassium chloride 10 mEq/100 mL IVPB (Peripheral Line)  10 mEq Intravenous PRN Primo Leslie MD       [facility] magnesium sulfate 1 g in dextrose 5% 100 mL IVPB  1 g Intravenous PRN Gracie Novoa MD        acetaminophen (TYLENOL) suppository 650 mg  650 mg Rectal Q4H PRN Gracie Novoa MD        piperacillin-tazobactam (ZOSYN) 3.375 g in dextrose 5 % 50 mL IVPB extended infusion (mini-bag)  3.375 g Intravenous Q8H Gertrudis Michelle MD 12.5 mL/hr at 12/27/19 0433 3.375 g at 12/27/19 0433     No Known Allergies  Active Problems:    Acute cholecystitis  Resolved Problems:    * No resolved hospital problems. *    Blood pressure 113/81, pulse 102, temperature 97.9 °F (36.6 °C), temperature source Axillary, resp. rate 16, height 4' 9\" (1.448 m), weight 125 lb 3.5 oz (56.8 kg), SpO2 99 %. Subjective:  Symptoms:  Stable. Pain:  He reports no pain. Objective:  General Appearance: In no acute distress and not in pain. Vital signs: (most recent): Blood pressure 113/81, pulse 102, temperature 97.9 °F (36.6 °C), temperature source Axillary, resp. rate 16, height 4' 9\" (1.448 m), weight 125 lb 3.5 oz (56.8 kg), SpO2 99 %. Heart: Normal rate. Regular rhythm. S1 normal and S2 normal.    Abdomen: Abdomen is soft. Bowel sounds are normal.   There is no abdominal tenderness. Assessment & Plan  43 yo w cerebral palsy and epilepsy and h/o pancreatitis admitted for abd pain and cholecystitis on CT. Is non verbal. Renaldo 5.1, AST//31, . Could very well have choledocholithiasis but is requiring continuous Cpap due to respiratory difficulties and is, therefore, too unstable for ERCP or even MRCP.     Plan:   1. Supportive care  2. Needs MRCP when stable enough, respiratory wise (timing to be determined by the primary team)  3. Consider possible ERCP pending MRCP results   4. Daily Labs  5.  Will follow     Abi Vyas MD       (O) [phone]    Abi Vyas MD  12/27/2019

## 2019-12-27 NOTE — PROGRESS NOTES
(assuming stable hemodynamics overall). Will follow for now. Surgical intervention unlikely in the short term.     XUAN [facility]

## 2019-12-27 NOTE — PROGRESS NOTES
12/26/19 2332   NIV Type   Equipment Type V60   Mode Bilevel   Mask Type Full face mask   Mask Size Medium   Settings/Measurements   IPAP 12 cmH20   CPAP/EPAP 6 cmH2O   Rate Ordered 12   Resp 15   FiO2  50 %   Vt Exhaled 410 mL   Minute Volume 13.7 Liters   Mask Leak (lpm) 3 lpm   Comfort Level Good   Using Accessory Muscles No   SpO2 99

## 2019-12-27 NOTE — CARE COORDINATION
Per chart review, patient non verbal and is currently sleeping with a sitter in the room. Placed call to patient's sister. She states that patient lives at [address] [place] [address]. Patient is bed bound and sister states he is [facility] a [de-identified] old baby\". She and the family are very engaged with patient and his well being. She states that she is currently in [place] with her family but to please contact if anything emergent arises and she will fly home asap. Will continue to follow.

## 2019-12-27 NOTE — PROGRESS NOTES
12/27/19 0323   NIV Type   $NIV $Daily Charge   Equipment Type V60   Mode Bilevel   Mask Type Full face mask   Mask Size Medium   Settings/Measurements   IPAP 12 cmH20   CPAP/EPAP 6 cmH2O   Rate Ordered 12   Resp 15   FiO2  50 %   Vt Exhaled 390 mL   Minute Volume 6.2 Liters   Mask Leak (lpm) 7 lpm   Comfort Level Good   Using Accessory Muscles No   SpO2 98   Breath Sounds   Right Upper Lobe Rhonchi   Right Middle Lobe Diminished   Right Lower Lobe Rhonchi   Left Upper Lobe Diminished   Left Lower Lobe Diminished   Alarm Settings   Alarms On Y   Press Low Alarm 6 cmH2O   High Pressure Alarm 40 cmH2O   Delay Alarm 20 sec(s)   Resp Rate Low Alarm 6   High Respiratory Rate 40 br/min

## 2019-12-27 NOTE — PROGRESS NOTES
12/26/19 2056   NIV Type   Equipment Type v60   Mode Bilevel   Mask Type Full face mask   Mask Size Medium   Bonnet size Medium   Settings/Measurements   IPAP 12 cmH20   CPAP/EPAP 6 cmH2O   Rate Ordered 12   Resp 16   FiO2  50 %   Vt Exhaled 307 mL   Minute Volume 6.9 Liters   Mask Leak (lpm) 0 lpm   Comfort Level Good   Using Accessory Muscles No   SpO2 98   Breath Sounds   Right Upper Lobe Rhonchi   Right Middle Lobe Diminished   Right Lower Lobe Rhonchi;Diminished   Left Upper Lobe Diminished   Left Lower Lobe Diminished   Alarm Settings   Alarms On Y   Press Low Alarm 6 cmH2O   High Pressure Alarm 40 cmH2O   Delay Alarm 20 sec(s)   Apnea (secs) 20 secs   Resp Rate Low Alarm 6   High Respiratory Rate 40 br/min

## 2019-12-28 LAB
A/G RATIO: 0.6 (ref 1.1–2.2)
ALBUMIN SERPL-MCNC: 2.3 G/DL (ref 3.4–5)
ALP BLD-CCNC: 159 U/L (ref 40–129)
ALT SERPL-CCNC: 33 U/L (ref 10–40)
ANION GAP SERPL CALCULATED.3IONS-SCNC: 5 MMOL/L (ref 3–16)
AST SERPL-CCNC: 118 U/L (ref 15–37)
BILIRUB SERPL-MCNC: 3.3 MG/DL (ref 0–1)
BLOOD CULTURE, ROUTINE: NORMAL
BUN BLDV-MCNC: 15 MG/DL (ref 7–20)
CALCIUM SERPL-MCNC: 8.5 MG/DL (ref 8.3–10.6)
CHLORIDE BLD-SCNC: 90 MMOL/L (ref 99–110)
CO2: 42 MMOL/L (ref 21–32)
CREAT SERPL-MCNC: <0.5 MG/DL (ref 0.9–1.3)
CULTURE, BLOOD 2: NORMAL
GFR AFRICAN AMERICAN: >60
GFR NON-AFRICAN AMERICAN: >60
GLOBULIN: 4.1 G/DL
GLUCOSE BLD-MCNC: 105 MG/DL (ref 70–99)
GLUCOSE BLD-MCNC: 135 MG/DL (ref 70–99)
GLUCOSE BLD-MCNC: 141 MG/DL (ref 70–99)
GLUCOSE BLD-MCNC: 81 MG/DL (ref 70–99)
GLUCOSE BLD-MCNC: 89 MG/DL (ref 70–99)
GLUCOSE BLD-MCNC: 90 MG/DL (ref 70–99)
GLUCOSE BLD-MCNC: 95 MG/DL (ref 70–99)
HCT VFR BLD CALC: 37.4 % (ref 40.5–52.5)
HEMOGLOBIN: 12.6 G/DL (ref 13.5–17.5)
LV EF: 58 %
LVEF MODALITY: NORMAL
MCH RBC QN AUTO: 36.4 PG (ref 26–34)
MCHC RBC AUTO-ENTMCNC: 33.8 G/DL (ref 31–36)
MCV RBC AUTO: 107.7 FL (ref 80–100)
PDW BLD-RTO: 13.7 % (ref 12.4–15.4)
PERFORMED ON: ABNORMAL
PERFORMED ON: NORMAL
PLATELET # BLD: 99 K/UL (ref 135–450)
PLATELET SLIDE REVIEW: ABNORMAL
PMV BLD AUTO: 7.4 FL (ref 5–10.5)
POTASSIUM REFLEX MAGNESIUM: 3.8 MMOL/L (ref 3.5–5.1)
RBC # BLD: 3.47 M/UL (ref 4.2–5.9)
SLIDE REVIEW: ABNORMAL
SODIUM BLD-SCNC: 137 MMOL/L (ref 136–145)
TOTAL PROTEIN: 6.4 G/DL (ref 6.4–8.2)
VALPROIC ACID, FREE: 61.8 UG/ML (ref 7–23)
WBC # BLD: 4.2 K/UL (ref 4–11)

## 2019-12-28 PROCEDURE — 6360000004 HC RX CONTRAST MEDICATION: Performed by: INTERNAL MEDICINE

## 2019-12-28 PROCEDURE — 6360000002 HC RX W HCPCS: Performed by: INTERNAL MEDICINE

## 2019-12-28 PROCEDURE — 2580000003 HC RX 258: Performed by: INTERNAL MEDICINE

## 2019-12-28 PROCEDURE — 6370000000 HC RX 637 (ALT 250 FOR IP): Performed by: INTERNAL MEDICINE

## 2019-12-28 PROCEDURE — 2060000000 HC ICU INTERMEDIATE R&B

## 2019-12-28 PROCEDURE — 94660 CPAP INITIATION&MGMT: CPT

## 2019-12-28 PROCEDURE — 2700000000 HC OXYGEN THERAPY PER DAY

## 2019-12-28 PROCEDURE — 99232 SBSQ HOSP IP/OBS MODERATE 35: CPT | Performed by: SURGERY

## 2019-12-28 PROCEDURE — 80053 COMPREHEN METABOLIC PANEL: CPT

## 2019-12-28 PROCEDURE — 85027 COMPLETE CBC AUTOMATED: CPT

## 2019-12-28 PROCEDURE — 36415 COLL VENOUS BLD VENIPUNCTURE: CPT

## 2019-12-28 PROCEDURE — 94761 N-INVAS EAR/PLS OXIMETRY MLT: CPT

## 2019-12-28 PROCEDURE — C8929 TTE W OR WO FOL WCON,DOPPLER: HCPCS

## 2019-12-28 PROCEDURE — 99233 SBSQ HOSP IP/OBS HIGH 50: CPT | Performed by: INTERNAL MEDICINE

## 2019-12-28 RX ORDER — FUROSEMIDE 10 MG/ML
20 INJECTION INTRAMUSCULAR; INTRAVENOUS ONCE
Status: COMPLETED | OUTPATIENT
Start: 2019-12-28 | End: 2019-12-28

## 2019-12-28 RX ADMIN — PHENOBARBITAL 30 MG: 30 TABLET ORAL at 13:18

## 2019-12-28 RX ADMIN — MORPHINE SULFATE 2 MG: 2 INJECTION, SOLUTION INTRAMUSCULAR; INTRAVENOUS at 12:02

## 2019-12-28 RX ADMIN — ENOXAPARIN SODIUM 40 MG: 40 INJECTION SUBCUTANEOUS at 08:54

## 2019-12-28 RX ADMIN — Medication 10 ML: at 08:55

## 2019-12-28 RX ADMIN — MORPHINE SULFATE 2 MG: 2 INJECTION, SOLUTION INTRAMUSCULAR; INTRAVENOUS at 18:15

## 2019-12-28 RX ADMIN — MORPHINE SULFATE 2 MG: 2 INJECTION, SOLUTION INTRAMUSCULAR; INTRAVENOUS at 23:06

## 2019-12-28 RX ADMIN — Medication 10 ML: at 21:13

## 2019-12-28 RX ADMIN — PERFLUTREN 2.42 MG: 6.52 INJECTION, SUSPENSION INTRAVENOUS at 08:08

## 2019-12-28 RX ADMIN — PIPERACILLIN AND TAZOBACTAM 3.38 G: 3; .375 INJECTION, POWDER, FOR SOLUTION INTRAVENOUS at 04:23

## 2019-12-28 RX ADMIN — PHENOBARBITAL 30 MG: 30 TABLET ORAL at 21:13

## 2019-12-28 RX ADMIN — MORPHINE SULFATE 2 MG: 2 INJECTION, SOLUTION INTRAMUSCULAR; INTRAVENOUS at 06:18

## 2019-12-28 RX ADMIN — LEVOTHYROXINE SODIUM 88 MCG: 0.09 TABLET ORAL at 05:09

## 2019-12-28 RX ADMIN — PIPERACILLIN AND TAZOBACTAM 3.38 G: 3; .375 INJECTION, POWDER, FOR SOLUTION INTRAVENOUS at 12:04

## 2019-12-28 RX ADMIN — Medication 10 ML: at 04:24

## 2019-12-28 RX ADMIN — PHENOBARBITAL 30 MG: 30 TABLET ORAL at 08:54

## 2019-12-28 RX ADMIN — Medication 10 ML: at 06:18

## 2019-12-28 RX ADMIN — PIPERACILLIN AND TAZOBACTAM 3.38 G: 3; .375 INJECTION, POWDER, FOR SOLUTION INTRAVENOUS at 21:13

## 2019-12-28 RX ADMIN — VALPROIC ACID 500 MG: 250 SOLUTION ORAL at 08:54

## 2019-12-28 RX ADMIN — FUROSEMIDE 20 MG: 10 INJECTION, SOLUTION INTRAMUSCULAR; INTRAVENOUS at 13:16

## 2019-12-28 RX ADMIN — VALPROIC ACID 500 MG: 250 SOLUTION ORAL at 13:18

## 2019-12-28 ASSESSMENT — PAIN SCALES - GENERAL
PAINLEVEL_OUTOF10: 7
PAINLEVEL_OUTOF10: 7

## 2019-12-28 NOTE — PROGRESS NOTES
12/27/19 0448   NIV Type   Equipment Type V60   Mode Bilevel   Mask Type Full face mask   Mask Size Medium   Bonnet size Medium   Settings/Measurements   IPAP 14 cmH20   CPAP/EPAP 7 cmH2O   Rate Ordered 12   Resp 15   FiO2  50 %   Vt Exhaled 301 mL   Mask Leak (lpm) 11 lpm   Comfort Level Good   Using Accessory Muscles No   SpO2 98   Breath Sounds   Right Upper Lobe Diminished   Right Middle Lobe Diminished   Right Lower Lobe Diminished   Left Upper Lobe Diminished   Left Lower Lobe Diminished   Alarm Settings   Alarms On Y   Press Low Alarm 6 cmH2O   High Pressure Alarm 30 cmH2O   Delay Alarm 20 sec(s)   Resp Rate Low Alarm 6   High Respiratory Rate 40 br/min

## 2019-12-28 NOTE — PROGRESS NOTES
Pt appears comfortable at this time, after PRN Morphine 2  Mg. Given PIV @ 0618 hrs. D/t constant moaning/grimacing. Will continue to monitor.  CARMEN

## 2019-12-28 NOTE — PROGRESS NOTES
4 Eyes Skin Assessment     The patient is being assess for   low kael score    I agree that 2 RN's have performed a thorough Head to Toe Skin Assessment on the patient. ALL assessment sites listed below have been assessed. Areas assessed by both nurses:   [x]   Head, Face, and Ears   [x]   Shoulders, Back, and Chest, Abdomen  [x]   Arms, Elbows, and Hands   [x]   Coccyx, Sacrum, and Ischium  [x]   Legs, Feet, and Heels            **SHARE this note so that the co-signing nurse is able to place an eSignature**    Co-signer eSignature: {Esignature:[id number]}    Does the Patient have Skin Breakdown?   Yes LDA WOUND CARE was Initiated documentation include the [place]-wound, Wound Assessment, Measurements, Dressing Treatment, Drainage, and Color\",          Kael Prevention initiated:  Yes   Wound Care Orders initiated:  Yes      [id number]  nurse consulted for Pressure Injury (Stage 3,4, Unstageable, DTI, NWPT, Complex wounds)and New or Established Ostomies:  NA      Primary Nurse eSignature: Electronically signed by Luc Rodriguez RN on 12/28/19 at 1:11 PM

## 2019-12-28 NOTE — PROGRESS NOTES
Pt moaning, taken off Bipap machine. Kept comfortable in bed. Placed on 6 L per Hi jorge NC. RT ( [place])  made aware. Pt went back to sleep. Will continue to monitor.  SAULRN

## 2019-12-28 NOTE — PROGRESS NOTES
4 Eyes Skin Assessment     The patient is being assess for   Low Kael score    I agree that 2 RN's have performed a thorough Head to Toe Skin Assessment on the patient. ALL assessment sites listed below have been assessed. Areas assessed by both nurses:   [x]   Head, Face, and Ears   [x]   Shoulders, Back, and Chest, Abdomen  [x]   Arms, Elbows, and Hands   [x]   Coccyx, Sacrum, and Ischium  [x]   Legs, Feet, and Heels        Mepilex dressing applied to sacral area for Preventative measure. Feet on waffle cushion while in bed. **SHARE this note so that the co-signing nurse is able to place an eSignature**    Co-signer eSignature: Electronically signed by Rizwana Zamora RN on 12/27/19 at 10:51 PM    Does the Patient have Skin Breakdown?   Yes LDA WOUND CARE was Initiated documentation include the [place]-wound, Wound Assessment, Measurements, Dressing Treatment, Drainage, and Color\",          Kael Prevention initiated:  Yes   Wound Care Orders initiated:  Yes      [address]  nurse consulted for Pressure Injury (Stage 3,4, Unstageable, DTI, NWPT, Complex wounds)and New or Established Ostomies:  No      Primary Nurse eSignature: Gonzalez Ramirez Rn @ 2016 hrs.       eyed

## 2019-12-28 NOTE — PROGRESS NOTES
Hospitalist Progress Note      PCP: Silver Solorio    Date of Admission: 12/24/2019    Chief Complaint:  Abdominal pain     Hospital Course: The patient is a 52 y. o. male who presents to [facility]) with acute onset and progressive course of abdominal pain. Pt is non verbal hx obtained from family and records. Per family pt. With increasing groans over the last few days pt. With g tube and npo today with diarrhea no fever chills vomiting hx of similar episode one year ago and was dx with pancreatitis     Subjective:   Off bipap  PEG tube is on hold, IV fluids were discontinued       Medications:  Reviewed    Infusion Medications   Scheduled Medications    levothyroxine  88 mcg Per G Tube Daily    PHENobarbital  30 mg Per G Tube TID    valproate  500 mg Per G Tube TID    sodium chloride flush  10 mL Intravenous 2 times per day    enoxaparin  40 mg Subcutaneous Daily    piperacillin-tazobactam  3.375 g Intravenous Q8H     PRN Meds: ipratropium, levalbuterol, perflutren lipid microspheres, morphine, sodium chloride flush, magnesium hydroxide, ondansetron, potassium chloride **OR** potassium alternative oral replacement **OR** potassium chloride, magnesium sulfate, acetaminophen      Intake/Output Summary (Last 24 hours) at 12/28/2019 1247  Last data filed at 12/28/2019 0618  Gross per 24 hour   Intake 1780 ml   Output 500 ml   Net 1280 ml       Physical Exam Performed:    /84   Pulse 89   Temp 98 °F (36.7 °C) (Axillary)   Resp 18   Ht 4' 9\" (1.448 m)   Wt 123 lb 3.8 oz (55.9 kg)   SpO2 100%   BMI 26.67 kg/m²     General appearance: Quadriparesis, appears ill, on oxygen  HEENT: Pupils equal, round, and reactive to light. Conjunctivae/corneas clear. Neck: Supple,. jugular venous distention. Trachea midline. Respiratory:  Normal respiratory effort. Crackles and conducted sound,   Cardiovascular: Regular rate and rhythm with normal S1/S2 without murmurs, rubs or gallops.   Abdomen: firm , ?-tender, non-distended with normal bowel sounds. Musculoskeletal: No clubbing, cyanosis or edema bilaterally. Skin: Skin color, texture, turgor normal.  No rashes or lesions. Neurologic: Quadriparesis, nonverbal.  Psychiatric: awake  Capillary Refill: Brisk,< 3 seconds   Peripheral Pulses: +2 palpable,  Labs:   Recent Labs     12/26/19  0900 12/27/19  1109 12/28/19  1023   WBC 3.9* 5.1 4.2   HGB 13.3* 12.7* 12.6*   HCT 39.3* 37.0* 37.4*   * 104* 99*     Recent Labs     12/26/19  0859 12/27/19  1109 12/28/19  1023   * 137 137   K 4.9 4.0 3.8   CL 94* 89* 90*   CO2 33* 40* 42*   BUN 10 14 15   CREATININE <0.5* <0.5* <0.5*   CALCIUM 8.6 8.5 8.5     Recent Labs     12/26/19  0859 12/27/19  1109 12/28/19  1023   * 117* 118*   ALT 31 31 33   BILIDIR 4.4*  --   --    BILITOT 5.1* 3.4* 3.3*   ALKPHOS 175* 159* 159*     No results for input(s): INR in the last 72 hours. No results for input(s): [place] in the last 72 hours. Urinalysis:      Lab Results   Component Value Date    NITRU Negative 12/24/2019    WBCUA 3-5 12/24/2019    RBCUA 20-50 12/24/2019    BLOODU LARGE 12/24/2019    SPECGRAV 1.015 12/24/2019    GLUCOSEU 100 12/24/2019    GLUCOSEU Negative 12/24/2009       Radiology:  XR CHEST PORTABLE   Final Result   Mild right basilar atelectasis. Mild pneumonia is a differential   consideration. US GALLBLADDER RUQ   Final Result   1. Gallbladder sludge. XR CHEST PORTABLE   Final Result   Bibasilar and possibly left upper lobe atelectasis and/or pneumonia. CT ABDOMEN PELVIS W IV CONTRAST Additional Contrast? None   Final Result   Acute cholecystitis is suspected. XR CHEST PORTABLE   Final Result   Negative low lung volume portable examination.                  Assessment/Plan:    Active Hospital Problems    Diagnosis    Acute cholecystitis [K81.0]       Acute cholecystitis-per CT abdomen, ultrasound of the gallbladder revealed sludge -with abdominal

## 2019-12-28 NOTE — PROGRESS NOTES
[facility]    PATIENT NAME: Keara Lee     TODAY'S DATE: 12/28/2019    CHIEF COMPLAINT: None, nonverbal    INTERVAL HISTORY/HPI:    Pt still on high-dose oxygen, but receiving Lasix today. REVIEW OF SYSTEMS:  Pertinent positives and negatives as per interval history section    OBJECTIVE:  VITALS:  /84   Pulse 89   Temp 98 °F (36.7 °C) (Axillary)   Resp 18   Ht 4' 9\" (1.448 m)   Wt 123 lb 3.8 oz (55.9 kg)   SpO2 100%   BMI 26.67 kg/m²     INTAKE/OUTPUT:    I/O last 3 completed shifts: In: 1780 [I.V.:1680; NG/GT:100]  Out: 500 [Urine:500]  I/O this shift:  In: -   Out: 150 [Urine:150]    CONSTITUTIONAL:  fatigued and somnolent  LUNGS:  Respirations easy and unlabored  CARD:  regular rate and rhythm  ABDOMEN:  normal bowel sounds, soft, non-distended, non-tender     Data:  CBC:   Recent Labs     12/26/19  0900 12/27/19  1109 12/28/19  1023   WBC 3.9* 5.1 4.2   HGB 13.3* 12.7* 12.6*   HCT 39.3* 37.0* 37.4*   * 104* 99*     BMP:    Recent Labs     12/26/19  0859 12/27/19  1109 12/28/19  1023   * 137 137   K 4.9 4.0 3.8   CL 94* 89* 90*   CO2 33* 40* 42*   BUN 10 14 15   CREATININE <0.5* <0.5* <0.5*   GLUCOSE 98 151* 81     Hepatic:   Recent Labs     12/26/19  0859 12/27/19  1109 12/28/19  1023   * 117* 118*   ALT 31 31 33   BILITOT 5.1* 3.4* 3.3*   ALKPHOS 175* 159* 159*     Mag:    No results for input(s): MG in the last 72 hours. Phos:   No results for input(s): PHOS in the last 72 hours. INR: No results for input(s): INR in the last 72 hours. Radiology Review:  *Imaging personally reviewed by me. N/A      ASSESSMENT AND PLAN:  Possible obstructive biliary process. LFTs still elevated, but stable today     GI eval noted: possible MRCP once pulmonary status improved. Unsure if pt will be able to lie in MRI machine and be able to follow the commands needed, ie. Remaining still and holding his breath at times.     No surgical intervention planned until his

## 2019-12-28 NOTE — PLAN OF CARE
Problem: Nutrition  Goal: Optimal nutrition therapy  Outcome: Ongoing  Note:   Nutrition Problem: Inadequate energy intake  Intervention: Food and/or Nutrient Delivery: Start Tube Feeding  Nutritional Goals: Pt will tolerate nutrition support at goal this admission

## 2019-12-28 NOTE — CONSULTS
Nutrition Assessment (Enteral Nutrition)    Type and Reason for Visit: Initial, Consult    Nutrition Recommendations:   1. Recommend order \"Diet: Tube feed Bolus/ NPO\". Initiate Two Gustavo HN (Fluid restricted formula) at 240 mL 3x/day via G tube  2. Recommend 60 mL H20 flush before and after each bolus. Recommend reevaluate IV fluids at this time. Increase flush if Na increases greater than 145 mEq/L.  3. Monitor closely and correct lytes (K, Phos, Mg) before progressing TF to goal d/t risk of refeeding syndrome (hx extended inadequate nutritional intake). 4. Ensure head of bed is 30 - 45 degrees during bolus gastric feeding and for one hour after bolus. 5. Monitor for tolerance (Bowel habits, N/V, cramping). Nutrition Assessment: Consult recieved for TF. Nutritionally compromised AEB NPO x past 4 days. G tube in place. Pt is non-verbal and resides at a group home. Pt seen in room, no family present. Reviewed hard chart and spoke with RN. Home TF regimen obtained and ordered. RN reports diarrhea resolving. Malnutrition Assessment:  · Malnutrition Status: At risk for malnutrition(D/t limited information available. )    Nutrition Risk Level: High    Nutrition Needs:  · Estimated Daily Total Kcal: 1877-3820 kcals   · Estimated Daily Protein (g): 67-84 gm   · Estimated Daily Fluid (ml/day): 1 ml/kcal or per MD     Nutrition Diagnosis:   · Problem: Inadequate energy intake  · Etiology: related to Insufficient energy/nutrient consumption     Signs and symptoms:  as evidenced by NPO status due to medical condition    Objective Information:  · Nutrition-Focused Physical Findings: Contracture noted with CP history.  Non verbal pt  · Wound Type: Stage II, Pressure Ulcer  · Current Nutrition Therapies:  · Oral Diet Orders: NPO   · Oral Diet intake: NPO  · Oral Nutrition Supplement (ONS) Orders: None  · ONS intake: NPO  · Tube Feeding (TF) Orders:   · Feeding Route: Gastrostomy  · Formula: Fluid Restricted(Two Gustavo )  · Duration: Bolus  · Goal TF & Flush Orders Provides: Resume Home regimen of: Two Gustavo HN, 240 mL bolus 3x/day to provide 720 mL TV, 1440 kcals, 60 gm protein, 504 mL free water. + 60 mL free water flushes before and after each bolus to provide additional 360 mL free water. · Anthropometric Measures:  · Ht: 4' 9\" (144.8 cm)   · Current Body Wt: 123 lb (55.8 kg)  · Weight Change: HALINA d/t limited wt history. · BMI Classification: BMI 25.0 - 29.9 Overweight    Nutrition Interventions:   Start Tube Feeding  Continued Inpatient Monitoring    Nutrition Evaluation:   · Evaluation: Goals set   · Goals: Pt will tolerate nutrition support at goal this admission    · Monitoring: TF Intake, TF Tolerance, Monitor Bowel Function, Weight      Electronically signed by Tracie Tipton.  Naeem Hatfield RD, LD on 12/28/19 at 11:17 AM    Contact Number: Office: [phone]; [address]: [address]

## 2019-12-28 NOTE — PROGRESS NOTES
Please consider adding tube feed or IV fluids. Patient has been NPO for 4 days now per family. Urine output minimal and concentrated.

## 2019-12-28 NOTE — PROGRESS NOTES
PROGRESS NOTE  S:49 yrs Patient  admitted on 12/24/2019 with Acute cholecystitis [K81.0]  Acute cholecystitis [K81.0] . Patient resting. On high flow oxygen    Current Hospital Schedued Meds   levothyroxine  88 mcg Per G Tube Daily    PHENobarbital  30 mg Per G Tube TID    [Held by provider] valproate  500 mg Per G Tube TID    sodium chloride flush  10 mL Intravenous 2 times per day    piperacillin-tazobactam  3.375 g Intravenous Q8H     Current Hospital IV Meds    Current Hospital PRN Meds  ipratropium, levalbuterol, perflutren lipid microspheres, morphine, sodium chloride flush, magnesium hydroxide, ondansetron, potassium chloride **OR** potassium alternative oral replacement **OR** potassium chloride, magnesium sulfate, acetaminophen    Exam:   Vitals:    12/28/19 1539   BP: 98/67   Pulse: 112   Resp: 18   Temp: 98 °F (36.7 °C)   SpO2: 93%     I/O last 3 completed shifts:   In: 9126 [I.V.:1680; NG/GT:100]  Out: 525 [Urine:525]   General appearance: appears stated age and cooperative  HEENT: PERRLA  Neck: no adenopathy, no carotid bruit, no JVD, supple, symmetrical, trachea midline and thyroid not enlarged, symmetric, no tenderness/mass/nodules  Lungs: clear to auscultation bilaterally  Heart: regular rate and rhythm, S1, S2 normal, no murmur, click, rub or gallop  Abdomen: soft, non-tender; bowel sounds normal; no masses,  no organomegaly  Extremities: extremities normal, atraumatic, no cyanosis or edema     Labs:  CBC:   Recent Labs     12/26/19  0900 12/27/19  1109 12/28/19  1023   WBC 3.9* 5.1 4.2   HGB 13.3* 12.7* 12.6*   HCT 39.3* 37.0* 37.4*   .9* 107.5* 107.7*   * 104* 99*     BMP:   Recent Labs     12/26/19  0859 12/27/19  1109 12/28/19  1023   * 137 137   K 4.9 4.0 3.8   CL 94* 89* 90*   CO2 33* 40* 42*   BUN 10 14 15   CREATININE <0.5* <0.5* <0.5*     LIVER PROFILE:   Recent Labs     12/26/19  0859 12/27/19  1109 12/28/19  1023   * 117* 118*   ALT 31 31 33   PROT 6.6 6.6 6.4   BILIDIR 4.4*  --   --    BILITOT 5.1* 3.4* 3.3*   ALKPHOS 175* 159* 159*     PT/INR: No results for input(s): INR in the last 72 hours. Invalid input(s): PT    IMAGING:  Xr Chest Portable    Result Date: 12/27/2019  EXAMINATION: ONE XRAY VIEW OF THE CHEST 12/27/2019 6:05 am COMPARISON: 12/26/2019 HISTORY: ORDERING SYSTEM PROVIDED HISTORY: respiratory failure TECHNOLOGIST PROVIDED HISTORY: Reason for exam:->respiratory failure Reason for Exam: respiratory failure FINDINGS: The patient is rotated. There is scoliotic curvature of the thoracic spine, dextroconvexity. There is scoliotic curvature lumbar spine, with levo convexity. Lumbar support marquita system with multiple sub laminar wires is again noted. Cardiac silhouette is normal.  There is minimal linear opacity in the right lung base. Lungs are otherwise clear. There is no pneumothorax. Mild right basilar atelectasis. Mild pneumonia is a differential consideration. Hospital Problems           Last Modified POA    Acute cholecystitis 12/24/2019 Yes         Impression:  45 yo male with cerebral palsy and epilepsy who sees me in clinic with history of pancreatitis and intermittent abdominal pain presents with concerns for cholecystitis and biliary obstruction. No ductal dilation on CT, unable to evaluate on US. Recommendation:  1. Continue to optimize respiratory status  2. Was planning EGD/colonoscopy as an outpatient as workup for abdominal pain and colitis seen at [facility]. May consider while patient in house pending clinical status.       Timothy Jack,   5:02 PM 12/28/2019            [address] [place] [address] Office  [address] [place] [address] E   [place], [facility]  Phone: [phone]   Phone: [phone]  Fax: [phone]       Fax: [phone]

## 2019-12-28 NOTE — FLOWSHEET NOTE
12/27/19 2003   Assessment   Charting Type Shift assessment   Neurological   Level of Consciousness 0   Orientation Level Other (Comment);HALINA  (Pt i non verbal)   Cognition HALINA   R Hand  Contracture   L Hand  HALINA   R Foot Dorsiflexion HALINA   L Foot Dorsiflexion HALINA   R Foot Plantar Flexion HALINA   L Foot Plantar Flexion HALINA   RUE Motor Response Abnormal flexion (decorticate); Other (Comment)  (Contrated)   Sensation RUE HALINA   LUE Motor Response HALINA   Sensation LUE HALINA   RLE Motor Response HALINA   Sensation RLE HALINA   LLE Motor Response HALINA   Sensation LLE HALINA   Gag HALINA   Tongue Deviation HALINA   Skye Coma Scale   Eye Opening 4   Best Verbal Response 2   Best Motor Response 4   Skye Coma Scale Score 10   Respiratory   Respiratory Pattern Regular   Respiratory Depth Normal   Respiratory Quality/Effort Unlabored   Chest Assessment Chest expansion symmetrical   Breath Sounds   Right Upper Lobe Diminished   Right Middle Lobe Diminished   Right Lower Lobe Diminished   Left Upper Lobe Diminished   Left Lower Lobe Diminished   Cough/Sputum   Cough None   Cough and Deep Breathe   Cough and Deep Breathe Other (Comment)  (Pt uanble to follow command)   Cardiac   Cardiac Regularity Regular   Heart Sounds S1, S2   Cardiac Rhythm NSR;ST  (on tele monitor)   Cardiac Monitor   Telemetry Monitor On Yes   Telemetry Audible Yes   Telemetry Alarms Set Yes   Telemetry Box Number [id number]   Gastrointestinal   Passing Flatus Y   Abdomen Inspection Rounded;Soft; Other (Comment)  (G tube)   Tenderness Tenderness   RUQ Bowel Sounds Hypoactive   LUQ Bowel Sounds Hypoactive   RLQ Bowel Sounds Hypoactive   LLQ Bowel Sounds Hypoactive   Peripheral Vascular   RLE Edema +1;Pitting   LLE Edema +1;Pitting   Skin Integrity   Skin Integrity Bruising   Location scattered   Preventative Dressing No   Skin Integrity Site 2   Skin Integrity Location 2 Redness  (excoriation)   Location 2 Coccyx   Preventative Dressing No   Assessed this shift?  Yes  (skin red, blanchble, excoriated)   Skin Integrity Site 3   Skin Integrity Location 3 Redness    Location 3 Renaldo heels   Preventative Dressing No  (Pt is on Boots)   Musculoskeletal   RUE Contracture   LUE Limited movement   RL Extremity Contracture   LL Extremity Contracture   Genitourinary   Genitourinary (WDL)   (Pt has ladd cath)   Flank Tenderness HALINA   Suprapubic Tenderness HALINA   Dysuria HALINA   Wound 12/25/19 Foot Right stage 2    Date First Assessed/Time First Assessed: 12/25/19 0013   Present on Hospital Admission: Yes  Primary Wound Type: Pressure Injury  Location: Foot  Wound Location Orientation: Right  Wound Description (Comments): stage 2    Wound Pressure Stage  2   Offloading for Diabetic Foot Ulcers   (Pt is on boots)   Wound Assessment Clean;Dry   Drainage Amount None   Wound 12/25/19 Foot Left   Date First Assessed/Time First Assessed: 12/25/19 0013   Present on Hospital Admission: Yes  Primary Wound Type: Pressure Injury  Location: Foot  Wound Location Orientation: Left   Wound Pressure Stage  2   Dressing Status   (Pt  has boots)   Wound Assessment Clean;Dry   Wound 12/25/19 Coccyx Inner   Date First Assessed/Time First Assessed: 12/25/19 0013   Present on Hospital Admission: Yes  Primary Wound Type: (c) Other (comment)  Location: Coccyx  Wound Location Orientation: Inner   Wound Pressure Stage  2   Wound Assessment Red  (excoriated)   Drainage Amount None   Urethral Catheter Non-latex;Straight-tip 16 fr   Placement Date/Time: 12/24/19 2101   Urethral Catheter Timeout: Patient;Procedure;Site/Side;Sterile technique; Appropriate Equipment;Correct Position  Inserted by: Renaldo Manuel  Catheter Type: Non-latex;Straight-tip  Tube Size (fr): 16 fr  Catheter Balloon Si. ..    Urine Color Orange   Urine Appearance Hazy   Provider Notification   Reason for Communication Critical Value (comment)  (blood sugar)   Provider Name [place]   Provider Notification Nurse   Method of Communication Face to face   Notification Time 2003 Pt resting quietly in bed, Calm and cooperative. Pt kept comfortable in bed. Will continue to monitor.  CARMEN

## 2019-12-28 NOTE — PROGRESS NOTES
Pt was moaning@ 2200 hrs., grimacing. Pt repositioned in bed for comfort. Tylenol 1 sup given @ 2203 hrs. Pt appears calm and comfortable at this time. SR on tele HR=95. Will continue to monitor.  CARMEN

## 2019-12-28 NOTE — PROGRESS NOTES
Pulmonary & Critical Care Inpatient Progress Note   John Choi MD     REASON FOR TODAY'S VISIT:  Acute resp failure    SUBJECTIVE:   Remains on high flow oxygen  Tube feeds being restarted today    Scheduled Meds:   furosemide  20 mg Intravenous Once    levothyroxine  88 mcg Per G Tube Daily    PHENobarbital  30 mg Per G Tube TID    valproate  500 mg Per G Tube TID    sodium chloride flush  10 mL Intravenous 2 times per day    piperacillin-tazobactam  3.375 g Intravenous Q8H       Continuous Infusions:      PRN Meds:  ipratropium, levalbuterol, perflutren lipid microspheres, morphine, sodium chloride flush, magnesium hydroxide, ondansetron, potassium chloride **OR** potassium alternative oral replacement **OR** potassium chloride, magnesium sulfate, acetaminophen    ALLERGIES:  Patient has No Known Allergies. Objective:   PHYSICAL EXAM:  /84   Pulse 89   Temp 98 °F (36.7 °C) (Axillary)   Resp 18   Ht 4' 9\" (1.448 m)   Wt 123 lb 3.8 oz (55.9 kg)   SpO2 100%   BMI 26.67 kg/m²    Physical Exam  Constitutional:       General: He is not in acute distress. Appearance: He is well-developed. He is not diaphoretic. HENT:      Head: Normocephalic and atraumatic. Mouth/Throat:      Pharynx: No oropharyngeal exudate. Eyes:      Pupils: Pupils are equal, round, and reactive to light. Neck:      Musculoskeletal: Neck supple. Vascular: No JVD. Cardiovascular:      Heart sounds: Normal heart sounds. No murmur. No friction rub. No gallop. Pulmonary:      Effort: Pulmonary effort is normal.      Breath sounds: No wheezing or rales. Abdominal:      General: Bowel sounds are normal. There is no distension. Palpations: Abdomen is soft. Tenderness: There is no tenderness. Lymphadenopathy:      Cervical: No cervical adenopathy. Skin:     General: Skin is warm and dry. Findings: No rash. Neurological:      Mental Status: He is alert.       Cranial Nerves: Cranial nerve

## 2019-12-29 ENCOUNTER — APPOINTMENT (OUTPATIENT)
Dept: GENERAL RADIOLOGY | Age: 49
DRG: 444 | End: 2019-12-29
Payer: MEDICARE

## 2019-12-29 LAB
A/G RATIO: 0.6 (ref 1.1–2.2)
ALBUMIN SERPL-MCNC: 2.5 G/DL (ref 3.4–5)
ALP BLD-CCNC: 177 U/L (ref 40–129)
ALT SERPL-CCNC: 39 U/L (ref 10–40)
ANION GAP SERPL CALCULATED.3IONS-SCNC: 10 MMOL/L (ref 3–16)
AST SERPL-CCNC: 151 U/L (ref 15–37)
BASE EXCESS VENOUS: -1 MMOL/L (ref -3–3)
BILIRUB SERPL-MCNC: 4 MG/DL (ref 0–1)
BUN BLDV-MCNC: 15 MG/DL (ref 7–20)
CALCIUM SERPL-MCNC: 8.4 MG/DL (ref 8.3–10.6)
CARBOXYHEMOGLOBIN: 2.9 % (ref 0–1.5)
CHLORIDE BLD-SCNC: 87 MMOL/L (ref 99–110)
CO2: 42 MMOL/L (ref 21–32)
CREAT SERPL-MCNC: <0.5 MG/DL (ref 0.9–1.3)
GFR AFRICAN AMERICAN: >60
GFR NON-AFRICAN AMERICAN: >60
GLOBULIN: 4.5 G/DL
GLUCOSE BLD-MCNC: 115 MG/DL (ref 70–99)
GLUCOSE BLD-MCNC: 124 MG/DL (ref 70–99)
GLUCOSE BLD-MCNC: 139 MG/DL (ref 70–99)
GLUCOSE BLD-MCNC: 144 MG/DL (ref 70–99)
GLUCOSE BLD-MCNC: 96 MG/DL (ref 70–99)
HCO3 VENOUS: 22.9 MMOL/L (ref 23–29)
HCT VFR BLD CALC: 38.6 % (ref 40.5–52.5)
HEMOGLOBIN: 13.4 G/DL (ref 13.5–17.5)
MAGNESIUM: 1.9 MG/DL (ref 1.8–2.4)
MCH RBC QN AUTO: 36.8 PG (ref 26–34)
MCHC RBC AUTO-ENTMCNC: 34.6 G/DL (ref 31–36)
MCV RBC AUTO: 106.3 FL (ref 80–100)
METHEMOGLOBIN VENOUS: 0.2 %
O2 CONTENT, VEN: 18 VOL %
O2 SAT, VEN: 99 %
O2 THERAPY: ABNORMAL
PCO2, VEN: 35.8 MMHG (ref 40–50)
PDW BLD-RTO: 13.2 % (ref 12.4–15.4)
PERFORMED ON: ABNORMAL
PERFORMED ON: NORMAL
PH VENOUS: 7.42 (ref 7.35–7.45)
PHOSPHORUS: 2.2 MG/DL (ref 2.5–4.9)
PLATELET # BLD: 112 K/UL (ref 135–450)
PMV BLD AUTO: 7.7 FL (ref 5–10.5)
PO2, VEN: 151.9 MMHG (ref 25–40)
POTASSIUM SERPL-SCNC: 3.7 MMOL/L (ref 3.5–5.1)
RBC # BLD: 3.64 M/UL (ref 4.2–5.9)
SODIUM BLD-SCNC: 139 MMOL/L (ref 136–145)
TCO2 CALC VENOUS: 24 MMOL/L
TOTAL PROTEIN: 7 G/DL (ref 6.4–8.2)
TRIGL SERPL-MCNC: 193 MG/DL (ref 0–150)
WBC # BLD: 4.7 K/UL (ref 4–11)

## 2019-12-29 PROCEDURE — 2060000000 HC ICU INTERMEDIATE R&B

## 2019-12-29 PROCEDURE — 6360000002 HC RX W HCPCS: Performed by: INTERNAL MEDICINE

## 2019-12-29 PROCEDURE — 80053 COMPREHEN METABOLIC PANEL: CPT

## 2019-12-29 PROCEDURE — 71045 X-RAY EXAM CHEST 1 VIEW: CPT

## 2019-12-29 PROCEDURE — 84478 ASSAY OF TRIGLYCERIDES: CPT

## 2019-12-29 PROCEDURE — 2700000000 HC OXYGEN THERAPY PER DAY

## 2019-12-29 PROCEDURE — 99232 SBSQ HOSP IP/OBS MODERATE 35: CPT | Performed by: SURGERY

## 2019-12-29 PROCEDURE — 36415 COLL VENOUS BLD VENIPUNCTURE: CPT

## 2019-12-29 PROCEDURE — 83735 ASSAY OF MAGNESIUM: CPT

## 2019-12-29 PROCEDURE — 82803 BLOOD GASES ANY COMBINATION: CPT

## 2019-12-29 PROCEDURE — 6370000000 HC RX 637 (ALT 250 FOR IP): Performed by: INTERNAL MEDICINE

## 2019-12-29 PROCEDURE — 84100 ASSAY OF PHOSPHORUS: CPT

## 2019-12-29 PROCEDURE — 2580000003 HC RX 258: Performed by: INTERNAL MEDICINE

## 2019-12-29 PROCEDURE — 94660 CPAP INITIATION&MGMT: CPT

## 2019-12-29 PROCEDURE — 99233 SBSQ HOSP IP/OBS HIGH 50: CPT | Performed by: INTERNAL MEDICINE

## 2019-12-29 PROCEDURE — 85027 COMPLETE CBC AUTOMATED: CPT

## 2019-12-29 RX ORDER — FUROSEMIDE 10 MG/ML
20 INJECTION INTRAMUSCULAR; INTRAVENOUS EVERY 6 HOURS
Status: COMPLETED | OUTPATIENT
Start: 2019-12-29 | End: 2019-12-29

## 2019-12-29 RX ORDER — FENTANYL 12 UG/H
1 PATCH TRANSDERMAL
Status: DISCONTINUED | OUTPATIENT
Start: 2019-12-29 | End: 2020-01-03 | Stop reason: HOSPADM

## 2019-12-29 RX ORDER — SODIUM CHLORIDE 9 MG/ML
INJECTION, SOLUTION INTRAVENOUS
Status: DISPENSED
Start: 2019-12-29 | End: 2019-12-30

## 2019-12-29 RX ADMIN — PIPERACILLIN AND TAZOBACTAM 3.38 G: 3; .375 INJECTION, POWDER, FOR SOLUTION INTRAVENOUS at 20:22

## 2019-12-29 RX ADMIN — PIPERACILLIN AND TAZOBACTAM 3.38 G: 3; .375 INJECTION, POWDER, FOR SOLUTION INTRAVENOUS at 04:48

## 2019-12-29 RX ADMIN — Medication 10 ML: at 03:48

## 2019-12-29 RX ADMIN — FUROSEMIDE 20 MG: 10 INJECTION, SOLUTION INTRAMUSCULAR; INTRAVENOUS at 20:22

## 2019-12-29 RX ADMIN — MORPHINE SULFATE 2 MG: 2 INJECTION, SOLUTION INTRAMUSCULAR; INTRAVENOUS at 08:24

## 2019-12-29 RX ADMIN — MORPHINE SULFATE 2 MG: 2 INJECTION, SOLUTION INTRAMUSCULAR; INTRAVENOUS at 15:30

## 2019-12-29 RX ADMIN — PHENOBARBITAL 30 MG: 30 TABLET ORAL at 13:38

## 2019-12-29 RX ADMIN — Medication 10 ML: at 04:48

## 2019-12-29 RX ADMIN — MORPHINE SULFATE 2 MG: 2 INJECTION, SOLUTION INTRAMUSCULAR; INTRAVENOUS at 21:52

## 2019-12-29 RX ADMIN — PIPERACILLIN AND TAZOBACTAM 3.38 G: 3; .375 INJECTION, POWDER, FOR SOLUTION INTRAVENOUS at 12:28

## 2019-12-29 RX ADMIN — Medication 10 ML: at 08:18

## 2019-12-29 RX ADMIN — PHENOBARBITAL 30 MG: 30 TABLET ORAL at 08:17

## 2019-12-29 RX ADMIN — FUROSEMIDE 20 MG: 10 INJECTION, SOLUTION INTRAMUSCULAR; INTRAVENOUS at 13:53

## 2019-12-29 RX ADMIN — Medication 10 ML: at 20:22

## 2019-12-29 RX ADMIN — LEVOTHYROXINE SODIUM 88 MCG: 0.09 TABLET ORAL at 06:46

## 2019-12-29 RX ADMIN — MORPHINE SULFATE 2 MG: 2 INJECTION, SOLUTION INTRAMUSCULAR; INTRAVENOUS at 03:48

## 2019-12-29 RX ADMIN — ACETAMINOPHEN 650 MG: 650 SUPPOSITORY RECTAL at 17:04

## 2019-12-29 RX ADMIN — PHENOBARBITAL 30 MG: 30 TABLET ORAL at 20:22

## 2019-12-29 ASSESSMENT — PAIN SCALES - GENERAL: PAINLEVEL_OUTOF10: 7

## 2019-12-29 NOTE — PROGRESS NOTES
Pulmonary & Critical Care Inpatient Progress Note   Pedrito Sun MD     REASON FOR TODAY'S VISIT:  Acute resp failure    SUBJECTIVE:   Remains on high flow oxygen with intermittent bipap  Good urinary response to lasix yesterday but still net positive fluid balance    Scheduled Meds:   furosemide  20 mg Intravenous Q6H    levothyroxine  88 mcg Per G Tube Daily    PHENobarbital  30 mg Per G Tube TID    [Held by provider] valproate  500 mg Per G Tube TID    sodium chloride flush  10 mL Intravenous 2 times per day    piperacillin-tazobactam  3.375 g Intravenous Q8H       Continuous Infusions:      PRN Meds:  ipratropium, levalbuterol, perflutren lipid microspheres, morphine, sodium chloride flush, magnesium hydroxide, ondansetron, potassium chloride **OR** potassium alternative oral replacement **OR** potassium chloride, magnesium sulfate, acetaminophen    ALLERGIES:  Patient has No Known Allergies. Objective:   PHYSICAL EXAM:  /76   Pulse 119   Temp 98 °F (36.7 °C) (Axillary)   Resp 18   Ht 4' 9\" (1.448 m)   Wt 122 lb 12.8 oz (55.7 kg)   SpO2 97%   BMI 26.57 kg/m²    Physical Exam  Constitutional:       General: He is not in acute distress. Appearance: He is well-developed. He is not diaphoretic. HENT:      Head: Normocephalic and atraumatic. Mouth/Throat:      Pharynx: No oropharyngeal exudate. Eyes:      Pupils: Pupils are equal, round, and reactive to light. Neck:      Musculoskeletal: Neck supple. Vascular: No JVD. Cardiovascular:      Heart sounds: Normal heart sounds. No murmur. No friction rub. No gallop. Pulmonary:      Effort: Pulmonary effort is normal.      Breath sounds: No wheezing or rales. Abdominal:      General: Bowel sounds are normal. There is no distension. Palpations: Abdomen is soft. Tenderness: There is no tenderness. Lymphadenopathy:      Cervical: No cervical adenopathy. Skin:     General: Skin is warm and dry.       Findings: No rash.   Neurological:      Mental Status: He is alert. Cranial Nerves: Cranial nerve deficit present. Comments: nonverbal            Data Reviewed:   LABS:  CBC:  Recent Labs     12/27/19  1109 12/28/19  1023 12/29/19  0501   WBC 5.1 4.2 4.7   HGB 12.7* 12.6* 13.4*   HCT 37.0* 37.4* 38.6*   .5* 107.7* 106.3*   * 99* 112*     BMP:  Recent Labs     12/27/19  1109 12/28/19  1023 12/29/19  0501    137 139   K 4.0 3.8 3.7   CL 89* 90* 87*   CO2 40* 42* 42*   PHOS  --   --  2.2*   BUN 14 15 15   CREATININE <0.5* <0.5* <0.5*     LIVER PROFILE:   Recent Labs     12/27/19  1109 12/28/19  1023 12/29/19  0501   * 118* 151*   ALT 31 33 39   BILITOT 3.4* 3.3* 4.0*   ALKPHOS 159* 159* 177*     PT/INR:No results for input(s): PROTIME, INR in the last 72 hours. APTT: No results for input(s): APTT in the last 72 hours. UA:No results for input(s): NITRITE, COLORU, PHUR, LABCAST, WBCUA, RBCUA, MUCUS, TRICHOMONAS, YEAST, BACTERIA, CLARITYU, SPECGRAV, LEUKOCYTESUR, UROBILINOGEN, BILIRUBINUR, BLOODU, GLUCOSEU, AMORPHOUS in the last 72 hours. Invalid input(s): Kadie Saxena  Recent Labs     12/26/19  1616 12/27/19  0815   PHART 7.335* 7.356   KHY6MNR 75.6* 67.2*   PO2ART 97.0 161.4*       Vent Information  FiO2 : 50 %    CXR personally reviewed, reduced lung volumes, left basilar atelectasis           Assessment:     1. Acute on chronic resp failure, hypoxic              -acute pulm edema vs mucous plugging              -restrictive lung disease/kyphoscoliosis  2. Elevated bilirubin, leukopenia/sepsis              -?obstructive jaundice/cholangitis  3. Chronic cognitive impairment, nonverbal              -cerebral palsy  4.  Chronic dysphagia with G tube    Plan:      -repeat lasix today, monitor electrolytes   -Bronch tomorrow  -Wean FIO2  -BiPAP qhs   -Tube feeds  -Surgery following    Aaliyah Ivy MD

## 2019-12-29 NOTE — PROGRESS NOTES
[facility]    PATIENT NAME: Yanet Soler     TODAY'S DATE: 12/29/2019    CHIEF COMPLAINT: None, nonverbal    INTERVAL HISTORY/HPI:    Pt still on high-dose oxygen alternating with BiPAP. REVIEW OF SYSTEMS:  Pertinent positives and negatives as per interval history section    OBJECTIVE:  VITALS:  /65   Pulse 121   Temp 98 °F (36.7 °C) (Axillary)   Resp 18   Ht 4' 9\" (1.448 m)   Wt 122 lb 12.8 oz (55.7 kg)   SpO2 100%   BMI 26.57 kg/m²     INTAKE/OUTPUT:    I/O last 3 completed shifts: In: 300 [I.V.:30; NG/GT:270]  Out: 810 [Urine:810]  No intake/output data recorded. CONSTITUTIONAL:  fatigued and somnolent  LUNGS:  Respirations easy and unlabored  CARD:  tachycardic with regular rhythm  ABDOMEN:  normal bowel sounds, soft, non-distended, no pain response to palpation    Data:  CBC:   Recent Labs     12/27/19  1109 12/28/19  1023 12/29/19  0501   WBC 5.1 4.2 4.7   HGB 12.7* 12.6* 13.4*   HCT 37.0* 37.4* 38.6*   * 99* 112*     BMP:    Recent Labs     12/27/19  1109 12/28/19  1023 12/29/19  0501    137 139   K 4.0 3.8 3.7   CL 89* 90* 87*   CO2 40* 42* 42*   BUN 14 15 15   CREATININE <0.5* <0.5* <0.5*   GLUCOSE 151* 81 144*     Hepatic:   Recent Labs     12/27/19  1109 12/28/19  1023 12/29/19  0501   * 118* 151*   ALT 31 33 39   BILITOT 3.4* 3.3* 4.0*   ALKPHOS 159* 159* 177*     Mag:      Recent Labs     12/29/19  0501   MG 1.90      Phos:     Recent Labs     12/29/19  0501   PHOS 2.2*      INR: No results for input(s): INR in the last 72 hours. Radiology Review:  *Imaging personally reviewed by me. Chest x-ray shows left lower lung groundglass opacity      ASSESSMENT AND PLAN:  Possible obstructive biliary process. LFTs still elevated     GI eval noted: possible MRCP once pulmonary status improved. Unsure if pt will be able to lie in MRI machine and be able to follow the commands needed, ie.  Remaining still and holding his breath at times.     No surgical

## 2019-12-29 NOTE — PROGRESS NOTES
4 Eyes Skin Assessment     The patient is being assess for   Low Kael score    I agree that 2 RN's have performed a thorough Head to Toe Skin Assessment on the patient. ALL assessment sites listed below have been assessed. Areas assessed by both nurses:   [x]   Head, Face, and Ears   [x]   Shoulders, Back, and Chest, Abdomen  [x]   Arms, Elbows, and Hands   [x]   Coccyx, Sacrum, and Ischium  [x]   Legs, Feet, and Heels  Mepilex dressing applied to sacral area for preventative mesure    **SHARE this note so that the co-signing nurse is able to place an eSignature**    Co-signer eSignature: Electronically signed by Claude Ramesh RN on 12/29/2019 at 12:32 AM      Does the Patient have Skin Breakdown?   Yes LDA WOUND CARE was Initiated documentation include the [place]-wound, Wound Assessment, Measurements, Dressing Treatment, Drainage, and Color\",          Kael Prevention initiated:  Yes   Wound Care Orders initiated:  Yes      [address]  nurse consulted for Pressure Injury (Stage 3,4, Unstageable, DTI, NWPT, Complex wounds)and New or Established Ostomies:  No      Primary Nurse eSignature: Electronically signed by Cecelia Vernon RN on 12/28/19 at 9:17 PM

## 2019-12-29 NOTE — PROGRESS NOTES
Hospitalist Progress Note      PCP: Depeika Fischer    Date of Admission: 12/24/2019    Chief Complaint:  Abdominal pain     Hospital Course: The patient is a 52 y. o. male who presents to [facility]) with acute onset and progressive course of abdominal pain. Pt is non verbal hx obtained from family and records. Per family pt. With increasing groans over the last few days pt. With g tube and npo today with diarrhea no fever chills vomiting hx of similar episode one year ago and was dx with pancreatitis     Subjective:   Off bipap  PEG tube is on hold, IV fluids were discontinued       Medications:  Reviewed    Infusion Medications   Scheduled Medications    levothyroxine  88 mcg Per G Tube Daily    PHENobarbital  30 mg Per G Tube TID    [Held by provider] valproate  500 mg Per G Tube TID    sodium chloride flush  10 mL Intravenous 2 times per day    piperacillin-tazobactam  3.375 g Intravenous Q8H     PRN Meds: ipratropium, levalbuterol, perflutren lipid microspheres, morphine, sodium chloride flush, magnesium hydroxide, ondansetron, potassium chloride **OR** potassium alternative oral replacement **OR** potassium chloride, magnesium sulfate, acetaminophen      Intake/Output Summary (Last 24 hours) at 12/29/2019 1250  Last data filed at 12/29/2019 0646  Gross per 24 hour   Intake 300 ml   Output 810 ml   Net -510 ml       Physical Exam Performed:    /76   Pulse 119   Temp 98 °F (36.7 °C) (Axillary)   Resp 18   Ht 4' 9\" (1.448 m)   Wt 122 lb 12.8 oz (55.7 kg)   SpO2 97%   BMI 26.57 kg/m²     General appearance: Quadriparesis, appears ill, on oxygen  HEENT: Pupils equal, round, and reactive to light. Conjunctivae/corneas clear. Neck: Supple,. jugular venous distention. Trachea midline. Respiratory:  Normal respiratory effort. Crackles and conducted sound,   Cardiovascular: Regular rate and rhythm with normal S1/S2 without murmurs, rubs or gallops.   Abdomen: firm , ?-tender, Diagnosis    Acute cholecystitis [K81.0]       Acute cholecystitis-per CT abdomen, ultrasound of the gallbladder revealed sludge -with abdominal pain-morphine 2 mg IV every 4 as needed, continue Zosyn and surgical input appreciated, follow gi recs      Acute hypoxic respiratory failure-possible fluid overload aspiration pneumonitis cannot be ruled out  Oxygen, nebs, bipap prn abx possible bronch Monday       Cerebral palsy/quadriparesis /aphasia seizures-on phenobarbital hold valproic acid due to high levels resume tube feeding          Hyponatremia-improved with normal saline     Macrocytosis-TSH A51 and folic acid, TSH is mildly elevated free T4 is normal           Thrombocytopenia-hold lovenox,     DVT Prophylaxis: n/a  Diet: DIET TUBE FEEDING BOLUS NPO; Fluid Restricted (2 zaid HN); Gastrostomy; 240  Code Status: DNR-CCA        Dispo - inpt.     Malvin Figueroa MD

## 2019-12-29 NOTE — FLOWSHEET NOTE
12/28/19 2110   Assessment   Charting Type Shift assessment   Neurological   Level of Consciousness 0   Orientation Level HALINA   Cognition HALINA   Language Incomprehensible   R Hand  Contracture   L Hand  HALINA   R Foot Dorsiflexion HALINA   L Foot Dorsiflexion HALINA   R Foot Plantar Flexion HALINA   L Foot Plantar Flexion HALINA   RUE Motor Response Abnormal flexion (decorticate)  (contracted)   Sensation RUE HALINA   LUE Motor Response HALINA   Sensation LUE HALINA   RLE Motor Response HALINA   Sensation RLE HALINA   LLE Motor Response HALINA   Sensation LLE HALINA   Gag HALINA   Tongue Deviation HALINA   Skye Coma Scale   Eye Opening 4   Best Verbal Response 2   Best Motor Response 4   Thorp Coma Scale Score 10   HEENT   Teeth Missing teeth   Mucous Membrane Pink   Tongue Pink & moist   Right Eye Intact;Sclera yellow   Left Eye Intact;Sclera yellow   Respiratory   Respiratory Pattern Regular   Respiratory Depth Normal   Respiratory Quality/Effort Dyspnea with exertion   Chest Assessment Chest expansion symmetrical   Breath Sounds   Right Upper Lobe Fine Crackles   Right Middle Lobe Diminished   Right Lower Lobe Diminished   Left Upper Lobe Fine Crackles   Left Lower Lobe Diminished   Cough/Sputum   Cough Moist;Non-productive;Strong   Frequency Occasional   Sputum Amount None   Cardiac   Cardiac Regularity Regular   Heart Sounds S1, S2   Cardiac Rhythm ST  (on tele monitor)   Rhythm Interpretation   Pulse 114   Cardiac Monitor   Telemetry Monitor On Yes   Telemetry Audible Yes   Telemetry Alarms Set Yes   Telemetry Box Number 89   Gastrointestinal   Passing Flatus Y  (during assessment)   Abdomen Inspection Rounded  (G tube Mid abdomen)   Tenderness Tenderness   RUQ Bowel Sounds Hypoactive   LUQ Bowel Sounds Hypoactive   RLQ Bowel Sounds Hypoactive   LLQ Bowel Sounds Hypoactive   Peripheral Vascular   RLE Edema +1;Pitting   LLE Edema +1;Pitting   Skin Color/Condition   Skin Color Pale;Ecchymosis   Skin Integrity   Skin Integrity Bruising device (Describe)  Urine Returned: Yes   Catheter Indications Need for fluid management in critically ill patients in a critical care setting not able to be managed by other means such as BSC with hat, bedpan, urinal, condom catheter, or short term intermittent urethral catherization   Site Assessment No urethral drainage   Psychosocial   Patient Behaviors Calm

## 2019-12-29 NOTE — PROGRESS NOTES
Pt moans and screams quite a bit. Repositioned in bed for comfort. Pt was given Morphine 2 mg PIV as PRN. For pain/discomfort. Will continue to monitor.  MKRN

## 2019-12-29 NOTE — PROGRESS NOTES
Pt. Mother, Loretta Lamar, called and stated that she would like to speak with Dr. Elisa Cooley tomorrow. Notified Dr. Murali Molina.

## 2019-12-30 ENCOUNTER — APPOINTMENT (OUTPATIENT)
Dept: GENERAL RADIOLOGY | Age: 49
DRG: 444 | End: 2019-12-30
Payer: MEDICARE

## 2019-12-30 LAB
ANION GAP SERPL CALCULATED.3IONS-SCNC: 3 MMOL/L (ref 3–16)
BASE EXCESS ARTERIAL: 22.7 MMOL/L (ref -3–3)
BUN BLDV-MCNC: 18 MG/DL (ref 7–20)
CALCIUM SERPL-MCNC: 8.8 MG/DL (ref 8.3–10.6)
CARBOXYHEMOGLOBIN ARTERIAL: 0.7 % (ref 0–1.5)
CHLORIDE BLD-SCNC: 87 MMOL/L (ref 99–110)
CO2: 48 MMOL/L (ref 21–32)
CREAT SERPL-MCNC: <0.5 MG/DL (ref 0.9–1.3)
GFR AFRICAN AMERICAN: >60
GFR NON-AFRICAN AMERICAN: >60
GLUCOSE BLD-MCNC: 101 MG/DL (ref 70–99)
GLUCOSE BLD-MCNC: 105 MG/DL (ref 70–99)
GLUCOSE BLD-MCNC: 106 MG/DL (ref 70–99)
GLUCOSE BLD-MCNC: 107 MG/DL (ref 70–99)
GLUCOSE BLD-MCNC: 110 MG/DL (ref 70–99)
GLUCOSE BLD-MCNC: 133 MG/DL (ref 70–99)
HCO3 ARTERIAL: 53.1 MMOL/L (ref 21–29)
HEMOGLOBIN, ART, EXTENDED: 13.8 G/DL (ref 13.5–17.5)
MAGNESIUM: 2 MG/DL (ref 1.8–2.4)
METHEMOGLOBIN ARTERIAL: 0.5 %
O2 CONTENT ARTERIAL: 19 ML/DL
O2 SAT, ARTERIAL: 99 %
O2 THERAPY: ABNORMAL
PCO2 ARTERIAL: 88.9 MMHG (ref 35–45)
PERFORMED ON: ABNORMAL
PH ARTERIAL: 7.39 (ref 7.35–7.45)
PHOSPHORUS: 2 MG/DL (ref 2.5–4.9)
PO2 ARTERIAL: 166.3 MMHG (ref 75–108)
POTASSIUM SERPL-SCNC: 3.4 MMOL/L (ref 3.5–5.1)
SODIUM BLD-SCNC: 138 MMOL/L (ref 136–145)
TCO2 ARTERIAL: 55.8 MMOL/L

## 2019-12-30 PROCEDURE — 36600 WITHDRAWAL OF ARTERIAL BLOOD: CPT

## 2019-12-30 PROCEDURE — 99232 SBSQ HOSP IP/OBS MODERATE 35: CPT | Performed by: SURGERY

## 2019-12-30 PROCEDURE — 6360000002 HC RX W HCPCS: Performed by: INTERNAL MEDICINE

## 2019-12-30 PROCEDURE — 71045 X-RAY EXAM CHEST 1 VIEW: CPT

## 2019-12-30 PROCEDURE — 2700000000 HC OXYGEN THERAPY PER DAY

## 2019-12-30 PROCEDURE — 99233 SBSQ HOSP IP/OBS HIGH 50: CPT | Performed by: INTERNAL MEDICINE

## 2019-12-30 PROCEDURE — 36415 COLL VENOUS BLD VENIPUNCTURE: CPT

## 2019-12-30 PROCEDURE — 2580000003 HC RX 258: Performed by: INTERNAL MEDICINE

## 2019-12-30 PROCEDURE — 94660 CPAP INITIATION&MGMT: CPT

## 2019-12-30 PROCEDURE — 2060000000 HC ICU INTERMEDIATE R&B

## 2019-12-30 PROCEDURE — 83735 ASSAY OF MAGNESIUM: CPT

## 2019-12-30 PROCEDURE — 80048 BASIC METABOLIC PNL TOTAL CA: CPT

## 2019-12-30 PROCEDURE — 82803 BLOOD GASES ANY COMBINATION: CPT

## 2019-12-30 PROCEDURE — 6370000000 HC RX 637 (ALT 250 FOR IP): Performed by: INTERNAL MEDICINE

## 2019-12-30 PROCEDURE — 84100 ASSAY OF PHOSPHORUS: CPT

## 2019-12-30 PROCEDURE — 94761 N-INVAS EAR/PLS OXIMETRY MLT: CPT

## 2019-12-30 RX ADMIN — LEVOTHYROXINE SODIUM 88 MCG: 0.09 TABLET ORAL at 10:23

## 2019-12-30 RX ADMIN — PHENOBARBITAL 30 MG: 30 TABLET ORAL at 21:20

## 2019-12-30 RX ADMIN — PHENOBARBITAL 30 MG: 30 TABLET ORAL at 10:23

## 2019-12-30 RX ADMIN — Medication 10 ML: at 10:23

## 2019-12-30 RX ADMIN — MORPHINE SULFATE 2 MG: 2 INJECTION, SOLUTION INTRAMUSCULAR; INTRAVENOUS at 04:57

## 2019-12-30 RX ADMIN — PIPERACILLIN AND TAZOBACTAM 3.38 G: 3; .375 INJECTION, POWDER, FOR SOLUTION INTRAVENOUS at 22:19

## 2019-12-30 RX ADMIN — PIPERACILLIN AND TAZOBACTAM 3.38 G: 3; .375 INJECTION, POWDER, FOR SOLUTION INTRAVENOUS at 04:57

## 2019-12-30 RX ADMIN — PIPERACILLIN AND TAZOBACTAM 3.38 G: 3; .375 INJECTION, POWDER, FOR SOLUTION INTRAVENOUS at 13:02

## 2019-12-30 RX ADMIN — Medication 10 ML: at 21:20

## 2019-12-30 RX ADMIN — MORPHINE SULFATE 2 MG: 2 INJECTION, SOLUTION INTRAMUSCULAR; INTRAVENOUS at 10:30

## 2019-12-30 RX ADMIN — PHENOBARBITAL 30 MG: 30 TABLET ORAL at 16:05

## 2019-12-30 NOTE — PLAN OF CARE
Problem: Falls - Risk of:  Goal: Will remain free from falls  Description  Will remain free from falls  12/30/2019 1156 by Hattie Dave RN  Outcome: Ongoing  12/29/2019 2224 by Toan Weiner RN  Outcome: Ongoing  Goal: Absence of physical injury  Description  Absence of physical injury  Outcome: Ongoing     Problem: Risk for Impaired Skin Integrity  Goal: Tissue integrity - skin and mucous membranes  Description  Structural intactness and normal physiological function of skin and  mucous membranes.   12/30/2019 1156 by Hattie Dave RN  Outcome: Ongoing  12/29/2019 2224 by Toan Weiner RN  Outcome: Ongoing     Problem: Pain:  Goal: Pain level will decrease  Description  Pain level will decrease  12/30/2019 1156 by Hattie Dave RN  Outcome: Ongoing  12/29/2019 2224 by Toan Weiner RN  Outcome: Ongoing  Goal: Control of acute pain  Description  Control of acute pain  Outcome: Ongoing  Goal: Control of chronic pain  Description  Control of chronic pain  Outcome: Ongoing     Problem: Nutrition  Goal: Optimal nutrition therapy  12/30/2019 1156 by Hattie Dave RN  Outcome: Ongoing  12/29/2019 2224 by Toan Weiner RN  Outcome: Ongoing

## 2019-12-30 NOTE — PROGRESS NOTES
Mental Status: He is alert. Cranial Nerves: Cranial nerve deficit present. Comments: nonverbal            Data Reviewed:   LABS:  CBC:  Recent Labs     12/27/19  1109 12/28/19  1023 12/29/19  0501   WBC 5.1 4.2 4.7   HGB 12.7* 12.6* 13.4*   HCT 37.0* 37.4* 38.6*   .5* 107.7* 106.3*   * 99* 112*     BMP:  Recent Labs     12/28/19  1023 12/29/19  0501 12/30/19  0852    139 138   K 3.8 3.7 3.4*   CL 90* 87* 87*   CO2 42* 42* 48*   PHOS  --  2.2* 2.0*   BUN 15 15 18   CREATININE <0.5* <0.5* <0.5*     LIVER PROFILE:   Recent Labs     12/27/19  1109 12/28/19  1023 12/29/19  0501   * 118* 151*   ALT 31 33 39   BILITOT 3.4* 3.3* 4.0*   ALKPHOS 159* 159* 177*     PT/INR:No results for input(s): PROTIME, INR in the last 72 hours. APTT: No results for input(s): APTT in the last 72 hours. UA:No results for input(s): NITRITE, COLORU, PHUR, LABCAST, WBCUA, RBCUA, MUCUS, TRICHOMONAS, YEAST, BACTERIA, CLARITYU, SPECGRAV, LEUKOCYTESUR, UROBILINOGEN, BILIRUBINUR, BLOODU, GLUCOSEU, AMORPHOUS in the last 72 hours. Invalid input(s): KETONESU  No results for input(s): PHART, QGJ7ALT, PO2ART in the last 72 hours. Vent Information  FiO2 : 75 %    CXR personally reviewed, reduced lung volumes, left basilar atelectasis           Assessment:     1. Acute on chronic resp failure, hypoxic              -acute pulm edema vs mucous plugging              -restrictive lung disease/kyphoscoliosis  2. Elevated bilirubin, leukopenia/sepsis              -?obstructive jaundice/cholangitis  3. Chronic cognitive impairment, nonverbal              -cerebral palsy  4.  Chronic dysphagia with G tube    Plan:      -check ABG, continue bipap for now  -Received diuresis last 2 days, may have developed contraction alkalosis   -Bronch canceled for now  -Wean FIO2  -Tube feeds  -Surgery following    Carmen Stahl MD

## 2019-12-30 NOTE — PROGRESS NOTES
18   CREATININE <0.5* <0.5* <0.5*   CALCIUM 8.5 8.4 8.8   PHOS  --  2.2* 2.0*     Recent Labs     12/28/19  1023 12/29/19  0501   * 151*   ALT 33 39   BILITOT 3.3* 4.0*   ALKPHOS 159* 177*     No results for input(s): INR in the last 72 hours. No results for input(s): Murleen Iba in the last 72 hours. Urinalysis:      Lab Results   Component Value Date    NITRU Negative 12/24/2019    WBCUA 3-5 12/24/2019    RBCUA 20-50 12/24/2019    BLOODU LARGE 12/24/2019    SPECGRAV 1.015 12/24/2019    GLUCOSEU 100 12/24/2019    GLUCOSEU Negative 12/24/2009       Radiology:  XR CHEST PORTABLE   Final Result   Minimal bibasilar atelectasis or scarring. XR CHEST PORTABLE   Final Result   Mild ground-glass opacification in the left lower lung zone may represent   atelectasis. Asymmetric edema is not excluded. XR CHEST PORTABLE   Final Result   Mild right basilar atelectasis. Mild pneumonia is a differential   consideration. US GALLBLADDER RUQ   Final Result   1. Gallbladder sludge. XR CHEST PORTABLE   Final Result   Bibasilar and possibly left upper lobe atelectasis and/or pneumonia. CT ABDOMEN PELVIS W IV CONTRAST Additional Contrast? None   Final Result   Acute cholecystitis is suspected. XR CHEST PORTABLE   Final Result   Negative low lung volume portable examination.                  Assessment/Plan:    Active Hospital Problems    Diagnosis    Acute cholecystitis [K81.0]       Acute cholecystitis-per CT abdomen, ultrasound of the gallbladder revealed sludge -with abdominal pain-morphine continue Zosyn  Surgical and Gi following     No surgical procedures planned     Acute respiratory failure with hypoxemia aspiration pneumonitis, possible plugging persistent hypoxemia, continue BiPAP,  Oxygen, nebs, bipap prn abx family declined bronchoscopy , discussed with pulmonary     Cerebral palsy/quadriparesis /aphasia seizures-on phenobarbital hold valproic acid due to high levels      Hyponatremia- corrected     Macrocytosis-TSH C80 and folic acid, TSH is mildly elevated free T4 is normal         DVT Prophylaxis: SCD  Diet: DIET TUBE FEEDING BOLUS NPO; Fluid Restricted (2 zaid HN);  Gastrostomy; 240  Code Status: DNR-CCA    Dispo - 2 -3 Days    Kori Guerra MD

## 2019-12-30 NOTE — FLOWSHEET NOTE
Location Scattered   Preventative Dressing No   Skin Fold Management No   Multiple Skin Integrity Sites Yes   Skin Integrity Site 2   Skin Integrity Location 2 Excoriation; Redness  (Blanchable)   Location 2 Coccyx   Preventative Dressing No   Skin Integrity Site 3   Skin Integrity Location 3 Redness    Location 3 Bilateral heels   Preventative Dressing   (Padded boots)   Musculoskeletal   Musculoskeletal (WDL) X   RUE Contracture   LUE Limited movement   RL Extremity Deformity; Contracture   LL Extremity Deformity; Contracture   Genitourinary   Genitourinary (WDL) X  (ladd)   Flank Tenderness HALINA   Suprapubic Tenderness HALINA   Dysuria HALINA   Anus/Rectum   Anus/Rectum (WDL) WDL   Wound 12/25/19 Foot Right stage 2    Date First Assessed/Time First Assessed: 12/25/19 0013   Present on Hospital Admission: Yes  Primary Wound Type: Pressure Injury  Location: Foot  Wound Location Orientation: Right  Wound Description (Comments): stage 2    Dressing Status   (JOSE ROBERTO. padded boots)   Wound Assessment Dry;Red   Wound 12/25/19 Foot Left   Date First Assessed/Time First Assessed: 12/25/19 0013   Present on Hospital Admission: Yes  Primary Wound Type: Pressure Injury  Location: Foot  Wound Location Orientation: Left   Dressing Status   (JOSE ROBERTO, padded boots)   Wound Assessment Clean;Dry; Intact; Red   Urethral Catheter 16 fr   Placement Date/Time: 12/28/19 1535   Inserted by: Jeanine Perez RN  Tube Size (fr): 16 fr  Catheter Balloon Size: 10 mL  Securement Method: Securing device (Describe)  Urine Returned: Yes   Catheter Indications Need for fluid management in critically ill patients in a critical care setting not able to be managed by other means such as BSC with hat, bedpan, urinal, condom catheter, or short term intermittent urethral catherization   Site Assessment Urethral drainage   Urine Color Yellow   Psychosocial   Psychosocial (WDL) X   Patient Behaviors Calm; Not interactive  (anxious at times)

## 2019-12-30 NOTE — PROGRESS NOTES
[facility] GENERAL SURGERY    PATIENT NAME: Kristin Arzate     TODAY'S DATE: 12/30/2019    CHIEF COMPLAINT: None-nonverbal    INTERVAL HISTORY/HPI:    Pt on BiPap. Stable overnight. REVIEW OF SYSTEMS:  Pertinent positives and negatives as per interval history section    OBJECTIVE:  VITALS:  /89   Pulse 108   Temp 98.4 °F (36.9 °C) (Axillary)   Resp 12   Ht 4' 9\" (1.448 m)   Wt 123 lb 6.4 oz (56 kg)   SpO2 100%   BMI 26.70 kg/m²     INTAKE/OUTPUT:    I/O last 3 completed shifts: In: 270 [NG/GT:270]  Out: 650 [Urine:650]  No intake/output data recorded. CONSTITUTIONAL:  fatigued and somnolent  LUNGS:  Respirations easy and unlabored on Bipap  CARD:  tachycardic with regular rhythm  ABDOMEN:  normal bowel sounds, soft, non-distended, non-tender     Data:  CBC:   Recent Labs     12/27/19  1109 12/28/19  1023 12/29/19  0501   WBC 5.1 4.2 4.7   HGB 12.7* 12.6* 13.4*   HCT 37.0* 37.4* 38.6*   * 99* 112*     BMP:    Recent Labs     12/28/19  1023 12/29/19  0501 12/30/19  0852    139 138   K 3.8 3.7 3.4*   CL 90* 87* 87*   CO2 42* 42* 48*   BUN 15 15 18   CREATININE <0.5* <0.5* <0.5*   GLUCOSE 81 144* 106*     Hepatic:   Recent Labs     12/27/19  1109 12/28/19  1023 12/29/19  0501   * 118* 151*   ALT 31 33 39   BILITOT 3.4* 3.3* 4.0*   ALKPHOS 159* 159* 177*     Mag:      Recent Labs     12/29/19  0501 12/30/19  0852   MG 1.90 2.00      Phos:     Recent Labs     12/29/19  0501 12/30/19  0852   PHOS 2.2* 2.0*      INR: No results for input(s): INR in the last 72 hours. Radiology Review:  *Imaging personally reviewed by me. NA      ASSESSMENT AND PLAN:  Possible obstructive biliary process.  LFTs still elevated     GI eval noted: possible MRCP once pulmonary status improved. Unsure if pt will be able to lie in MRI machine and be able to follow the commands needed, ie.  Remaining still and holding his breath at times.     No surgical intervention planned until his underlying issues have been definitively evaluated.  In addition, we would certainly wait until his pulmonary issues have been addressed prior to considering surgical intervention     Electronically signed by Ezekiel Xiong MD     [id number]

## 2019-12-30 NOTE — PROGRESS NOTES
Page to Dr. Alex Paez: Pt's family is requesting to speak with you. Are you available or would you be able to talk to them some time in the morning?      Dr. Alex Paez to speak with family in the AM.

## 2019-12-30 NOTE — PROGRESS NOTES
Page to Dr. Steve Parry: I see in Dr. John Raphael note that it says Mady Earthly is canceled for now. Would you like me to reorder the tube feeds? Dr. Steve Parry: James Donaldson till cleared by GI or surgery. This RN replied: [facility] thanks. I did give meds.

## 2019-12-30 NOTE — PROGRESS NOTES
12/29/19 1920   NIV Type   NIV Started/Stopped On   Equipment Type V60   Mode Bilevel   Mask Type Full face mask   Mask Size Medium   Bonnet size Medium   Settings/Measurements   IPAP 15 cmH20   CPAP/EPAP 6 cmH2O   Rate Ordered 12   Resp 15   FiO2  100 %   Vt Exhaled 384 mL   Mask Leak (lpm) 1 lpm   Comfort Level Fair   Using Accessory Muscles No   SpO2 92   Breath Sounds   Right Upper Lobe Diminished;Rhonchi   Right Middle Lobe Diminished   Right Lower Lobe Diminished   Left Upper Lobe Diminished;Rhonchi   Left Lower Lobe Diminished   Patient Observation   Observations mepilex on nose   Alarm Settings   Alarms On Y   Press Low Alarm 6 cmH2O   High Pressure Alarm 30 cmH2O   Delay Alarm 20 sec(s)   Apnea (secs) 20 secs   Resp Rate Low Alarm 6   High Respiratory Rate 40 br/min   Oxygen Therapy/Pulse Ox   SpO2 92 %

## 2019-12-30 NOTE — FLOWSHEET NOTE
Location Scattered   Preventative Dressing No   Skin Fold Management No   Multiple Skin Integrity Sites Yes   Skin Integrity Site 2   Skin Integrity Location 2 Excoriation; Redness  (Blanchable)   Location 2 Coccyx   Skin Integrity Site 3   Skin Integrity Location 3 Redness    Location 3 Bilateral heels   Preventative Dressing   (Padded boots)   Musculoskeletal   Musculoskeletal (WDL) X   RUE Contracture   LUE Limited movement   RL Extremity Deformity; Contracture   LL Extremity Deformity; Contracture   Genitourinary   Genitourinary (WDL) X  (ladd)   Flank Tenderness HALINA   Suprapubic Tenderness HALINA   Dysuria HALINA   Anus/Rectum   Anus/Rectum (WDL) WDL   Wound 12/25/19 Foot Right stage 2    Date First Assessed/Time First Assessed: 12/25/19 0013   Present on Hospital Admission: Yes  Primary Wound Type: Pressure Injury  Location: Foot  Wound Location Orientation: Right  Wound Description (Comments): stage 2    Dressing Status   (JOSE ROBERTO. padded boots)   Wound Assessment Dry;Red   Wound 12/25/19 Foot Left   Date First Assessed/Time First Assessed: 12/25/19 0013   Present on Hospital Admission: Yes  Primary Wound Type: Pressure Injury  Location: Foot  Wound Location Orientation: Left   Dressing Status   (JOSE ROBERTO, padded boots)   Wound Assessment Clean;Dry; Intact; Red   Urethral Catheter 16 fr   Placement Date/Time: 12/28/19 1535   Inserted by:  Marilee Shore RN  Tube Size (fr): 16 fr  Catheter Balloon Size: 10 mL  Securement Method: Securing device (Describe)  Urine Returned: Yes   Catheter Indications Need for fluid management in critically ill patients in a critical care setting not able to be managed by other means such as BSC with hat, bedpan, urinal, condom catheter, or short term intermittent urethral catherization   Site Assessment Urethral drainage   Urine Color Yellow   Psychosocial   Psychosocial (WDL) X   Patient Behaviors Anxious;Calm  (anxious at times)

## 2019-12-30 NOTE — PROGRESS NOTES
12/30/19 0938   NIV Type   Skin Protection for O2 Device Yes   NIV Started/Stopped On   Equipment Type v60   Mode Bilevel   Mask Type Full face mask   Mask Size Medium   Bonnet size Medium   Settings/Measurements   IPAP 15 cmH20   CPAP/EPAP 6 cmH2O   Resp 12   FiO2  75 %   Vt Exhaled 325 mL   Minute Volume 4.8 Liters   Comfort Level Fair   Using Accessory Muscles No   SpO2 98   Breath Sounds   Right Upper Lobe Diminished   Right Middle Lobe Diminished   Right Lower Lobe Diminished   Left Upper Lobe Diminished   Left Lower Lobe Diminished   Patient Observation   Observations mepilex on nose   Alarm Settings   Alarms On Y   Press Low Alarm 6 cmH2O   High Pressure Alarm 40 cmH2O   Delay Alarm 20 sec(s)   Apnea (secs) 20 secs   Resp Rate Low Alarm 6   High Respiratory Rate 40 br/min   Oxygen Therapy/Pulse Ox   O2 Therapy Oxygen   $Oxygen $Daily Charge   O2 Device PAP (positive airway pressure)   $Pulse Oximeter $Spot check (multiple/continuous)

## 2019-12-30 NOTE — PROGRESS NOTES
12/30/19 0358   NIV Type   NIV Started/Stopped On   Equipment Type v60   Mode Bilevel   Mask Type Full face mask   Settings/Measurements   IPAP 15 cmH20   CPAP/EPAP 6 cmH2O   Rate Ordered 12   Resp 18   FiO2  75 %   Vt Exhaled 422 mL   Minute Volume 6.1 Liters   Mask Leak (lpm) 8 lpm   Comfort Level Fair   Using Accessory Muscles No   SpO2 98   Alarm Settings   Alarms On Y   Press Low Alarm 6 cmH2O   High Pressure Alarm 30 cmH2O   Delay Alarm 20 sec(s)   Resp Rate Low Alarm 6   High Respiratory Rate 40 br/min

## 2019-12-30 NOTE — PROGRESS NOTES
4 Eyes Skin Assessment     The patient is being assess for   Shift assessment/low Kael    I agree that 2 RN's have performed a thorough Head to Toe Skin Assessment on the patient. ALL assessment sites listed below have been assessed. Areas assessed by both nurses:   [x]   Head, Face, and Ears   [x]   Shoulders, Back, and Chest, Abdomen  [x]   Arms, Elbows, and Hands   [x]   Coccyx, Sacrum, and Ischium  [x]   Legs, Feet, and Heels            **SHARE this note so that the co-signing nurse is able to place an eSignature**    Co-signer eSignature: Electronically signed by Rojelio Pagan RN on 12/30/19 at 1:29 PM    Does the Patient have Skin Breakdown?   Yes LDA WOUND CARE was Initiated documentation include the [place]-wound, Wound Assessment, Measurements, Dressing Treatment, Drainage, and Color\",          Kael Prevention initiated:  Yes   Wound Care Orders initiated:  No      WOC nurse consulted for Pressure Injury (Stage 3,4, Unstageable, DTI, NWPT, Complex wounds)and New or Established Ostomies:  No      Primary Nurse eSignature: Electronically signed by Fidencio Webster RN on 12/30/19 at 12:35 PM

## 2019-12-30 NOTE — PROGRESS NOTES
ABG SENT TO LAB VIA TUBE SYSTEM       12/30/19 1303   Oxygen Therapy/Pulse Ox   O2 Therapy Oxygen   O2 Device PAP (positive airway pressure)   FiO2  75 %   $ABG $ABG   Blayne's Test #1 HALINA   Site #1 Left Radial   Site Prepped #1 Yes   Number of Attempts #1 1   Pressure Held #1 Yes   Complications #1 None   Post-procedure #1 Standard   Specimen Status #1 To lab   How Tolerated?  Tolerated well

## 2019-12-30 NOTE — PROGRESS NOTES
12/29/19 2346   NIV Type   NIV Started/Stopped On   Equipment Type v60   Mode Bilevel   Mask Type Full face mask   Settings/Measurements   IPAP 15 cmH20   CPAP/EPAP 6 cmH2O   Rate Ordered 12   Resp 12   FiO2  100 %   Vt Exhaled 287 mL   Minute Volume 3.5 Liters   Mask Leak (lpm) 4 lpm   Comfort Level Good   Using Accessory Muscles No   SpO2 98   Alarm Settings   Alarms On Y   Press Low Alarm 6 cmH2O   High Pressure Alarm 30 cmH2O   Delay Alarm 20 sec(s)   Resp Rate Low Alarm 6   High Respiratory Rate 40 br/min

## 2019-12-30 NOTE — PROGRESS NOTES
PROGRESS NOTE  S:49 yrs Patient  admitted on 12/24/2019 with Acute cholecystitis [K81.0]  Acute cholecystitis [K81.0] . Continued abdominal pain and respiratory issues today. Bronchoscopy being considered for tomorrow      Current Hospital Schedued Meds   fentaNYL  1 patch Transdermal Q72H    levothyroxine  88 mcg Per G Tube Daily    PHENobarbital  30 mg Per G Tube TID    [Held by provider] valproate  500 mg Per G Tube TID    sodium chloride flush  10 mL Intravenous 2 times per day    piperacillin-tazobactam  3.375 g Intravenous Q8H     Current Hospital IV Meds   sodium chloride       Current Hospital PRN Meds  ipratropium, levalbuterol, perflutren lipid microspheres, morphine, sodium chloride flush, magnesium hydroxide, ondansetron, potassium chloride **OR** potassium alternative oral replacement **OR** potassium chloride, magnesium sulfate, acetaminophen    Exam:   Vitals:    12/29/19 2019   BP: 114/76   Pulse: 109   Resp: 19   Temp: 97.6 °F (36.4 °C)   SpO2: 97%     I/O last 3 completed shifts:   In: 540 [I.V.:30; NG/GT:510]  Out: 860 [Urine:860]   General appearance: appears stated age and cooperative  HEENT: PERRLA  Neck: no adenopathy, no carotid bruit, no JVD, supple, symmetrical, trachea midline and thyroid not enlarged, symmetric, no tenderness/mass/nodules  Lungs: clear to auscultation bilaterally  Heart: regular rate and rhythm, S1, S2 normal, no murmur, click, rub or gallop  Abdomen: soft, non-tender; bowel sounds normal; no masses,  no organomegaly  Extremities: extremities normal, atraumatic, no cyanosis or edema     Labs:  CBC:   Recent Labs     12/27/19  1109 12/28/19  1023 12/29/19  0501   WBC 5.1 4.2 4.7   HGB 12.7* 12.6* 13.4*   HCT 37.0* 37.4* 38.6*   .5* 107.7* 106.3*   * 99* 112*     BMP:   Recent Labs     12/27/19  1109 12/28/19  1023 12/29/19  0501    137 139   K 4.0 3.8 3.7   CL 89* 90* 87*   CO2 40* 42* 42*   PHOS  --   -- 2.2*   BUN 14 15 15   CREATININE <0.5* <0.5* <0.5*     LIVER PROFILE:   Recent Labs     12/27/19  1109 12/28/19  1023 12/29/19  0501   * 118* 151*   ALT 31 33 39   PROT 6.6 6.4 7.0   BILITOT 3.4* 3.3* 4.0*   ALKPHOS 159* 159* 177*     PT/INR: No results for input(s): INR in the last 72 hours. Invalid input(s): PT    IMAGING:  Xr Chest Portable    Result Date: 12/29/2019  EXAMINATION: ONE XRAY VIEW OF THE CHEST 12/29/2019 5:24 am COMPARISON: 12/27/2019 radiograph HISTORY: ORDERING SYSTEM PROVIDED HISTORY: resp failure TECHNOLOGIST PROVIDED HISTORY: Reason for exam:->resp failure Reason for Exam: resp failure FINDINGS: Low lung volume due to inspiratory effort. Heart and mediastinum appear normal.  Pulmonary vascular markings are normal.  Right lung is clear. Mild ground-glass opacification at the left lung base may represent mild atelectasis. Postsurgical changes are seen in the lumbar spine. Mild ground-glass opacification in the left lower lung zone may represent atelectasis. Asymmetric edema is not excluded. Hospital Problems           Last Modified POA    Acute cholecystitis 12/24/2019 Yes         Impression:  43 yo male with cerebral palsy and epilepsy with hx pancreatitis and intermittent abdominal pain presents with concerns for cholecystitis. Planning bronchoscopy tomorrow. Recommendation:  1. Will await results of bronchoscopy.   Will want respiratory status improved prior to endoscopic evaluation      Lillie Quarles DO  9:05 PM 12/29/2019            [address]   [place], [facility]  Phone: [phone]   Phone: [phone]  Fax: [phone]       Fax: [phone]

## 2019-12-30 NOTE — PROGRESS NOTES
Page to Dr. Kristina Santoro: Duane Huddleston: critical lab value, PCO2 is 88.9 on ABG. Keep on BiPap per Dr. Kristina Santoro.

## 2019-12-30 NOTE — PROGRESS NOTES
12/30/19 1308   NIV Type   NIV Started/Stopped On   Equipment Type V60   Mode Bilevel   Mask Type Full face mask   Mask Size Medium   Settings/Measurements   IPAP 15 cmH20   CPAP/EPAP 6 cmH2O   Resp 28   FiO2  75 %   Vt Exhaled 670 mL   Minute Volume 11.4 Liters   Comfort Level Good   Using Accessory Muscles No   Breath Sounds   Right Upper Lobe Diminished   Right Middle Lobe Diminished   Right Lower Lobe Diminished   Left Upper Lobe Diminished   Left Lower Lobe Diminished   Patient Observation   Observations MEPILEX ON NOSE   Alarm Settings   Alarms On Y   Press Low Alarm 6 cmH2O   High Pressure Alarm 40 cmH2O   Delay Alarm 20 sec(s)   Apnea (secs) 20 secs   Resp Rate Low Alarm 6   High Respiratory Rate 40 br/min

## 2019-12-31 ENCOUNTER — APPOINTMENT (OUTPATIENT)
Dept: ULTRASOUND IMAGING | Age: 49
DRG: 444 | End: 2019-12-31
Payer: MEDICARE

## 2019-12-31 ENCOUNTER — APPOINTMENT (OUTPATIENT)
Dept: GENERAL RADIOLOGY | Age: 49
DRG: 444 | End: 2019-12-31
Payer: MEDICARE

## 2019-12-31 PROBLEM — E43 SEVERE MALNUTRITION (HCC): Status: ACTIVE | Noted: 2019-12-31

## 2019-12-31 LAB
ANION GAP SERPL CALCULATED.3IONS-SCNC: 10 MMOL/L (ref 3–16)
APPEARANCE BAL (LAVAGE): ABNORMAL
BASE EXCESS ARTERIAL: 20.4 MMOL/L (ref -3–3)
BUN BLDV-MCNC: 20 MG/DL (ref 7–20)
CALCIUM SERPL-MCNC: 8.8 MG/DL (ref 8.3–10.6)
CARBOXYHEMOGLOBIN ARTERIAL: 1.2 % (ref 0–1.5)
CHLORIDE BLD-SCNC: 86 MMOL/L (ref 99–110)
CLOT EVALUATION BAL: ABNORMAL
CO2: 41 MMOL/L (ref 21–32)
COLOR LAVAGE: COLORLESS
CREAT SERPL-MCNC: <0.5 MG/DL (ref 0.9–1.3)
EPITHELIAL CELLS FLUID: 71 %
GFR AFRICAN AMERICAN: >60
GFR NON-AFRICAN AMERICAN: >60
GLUCOSE BLD-MCNC: 111 MG/DL (ref 70–99)
GLUCOSE BLD-MCNC: 113 MG/DL (ref 70–99)
GLUCOSE BLD-MCNC: 114 MG/DL (ref 70–99)
GLUCOSE BLD-MCNC: 115 MG/DL (ref 70–99)
GLUCOSE BLD-MCNC: 119 MG/DL (ref 70–99)
GLUCOSE BLD-MCNC: 150 MG/DL (ref 70–99)
GLUCOSE BLD-MCNC: 180 MG/DL (ref 70–99)
HCO3 ARTERIAL: 48.6 MMOL/L (ref 21–29)
HEMOGLOBIN, ART, EXTENDED: 12.9 G/DL (ref 13.5–17.5)
MACROPHAGES, BAL: 3 % (ref 90–95)
MAGNESIUM: 1.9 MG/DL (ref 1.8–2.4)
METHEMOGLOBIN ARTERIAL: 0.4 %
NUMBER OF CELLS COUNTED BAL (LAVAGE): 100
O2 CONTENT ARTERIAL: 18 ML/DL
O2 SAT, ARTERIAL: 97.5 %
O2 THERAPY: ABNORMAL
PCO2 ARTERIAL: 72.9 MMHG (ref 35–45)
PERFORMED ON: ABNORMAL
PH ARTERIAL: 7.44 (ref 7.35–7.45)
PHOSPHORUS: 2.9 MG/DL (ref 2.5–4.9)
PO2 ARTERIAL: 96.4 MMHG (ref 75–108)
POTASSIUM SERPL-SCNC: 3.4 MMOL/L (ref 3.5–5.1)
RBC, BAL: 850 /CUMM
SEGMENTED NEUTROPHILS, BAL: 26 % (ref 5–10)
SODIUM BLD-SCNC: 137 MMOL/L (ref 136–145)
TCO2 ARTERIAL: 50.9 MMOL/L
WBC/EPI CELLS BAL: 425 /CUMM

## 2019-12-31 PROCEDURE — 99232 SBSQ HOSP IP/OBS MODERATE 35: CPT | Performed by: SURGERY

## 2019-12-31 PROCEDURE — 87632 RESP VIRUS 6-11 TARGETS: CPT

## 2019-12-31 PROCEDURE — 82803 BLOOD GASES ANY COMBINATION: CPT

## 2019-12-31 PROCEDURE — 88305 TISSUE EXAM BY PATHOLOGIST: CPT

## 2019-12-31 PROCEDURE — 87116 MYCOBACTERIA CULTURE: CPT

## 2019-12-31 PROCEDURE — 6360000002 HC RX W HCPCS: Performed by: INTERNAL MEDICINE

## 2019-12-31 PROCEDURE — 31624 DX BRONCHOSCOPE/LAVAGE: CPT | Performed by: INTERNAL MEDICINE

## 2019-12-31 PROCEDURE — 94761 N-INVAS EAR/PLS OXIMETRY MLT: CPT

## 2019-12-31 PROCEDURE — 31645 BRNCHSC W/THER ASPIR 1ST: CPT | Performed by: INTERNAL MEDICINE

## 2019-12-31 PROCEDURE — 36600 WITHDRAWAL OF ARTERIAL BLOOD: CPT

## 2019-12-31 PROCEDURE — 84100 ASSAY OF PHOSPHORUS: CPT

## 2019-12-31 PROCEDURE — 2580000003 HC RX 258: Performed by: INTERNAL MEDICINE

## 2019-12-31 PROCEDURE — 87070 CULTURE OTHR SPECIMN AEROBIC: CPT

## 2019-12-31 PROCEDURE — APPNB30 APP NON BILLABLE TIME 0-30 MINS: Performed by: CLINICAL NURSE SPECIALIST

## 2019-12-31 PROCEDURE — 88112 CYTOPATH CELL ENHANCE TECH: CPT

## 2019-12-31 PROCEDURE — 87015 SPECIMEN INFECT AGNT CONCNTJ: CPT

## 2019-12-31 PROCEDURE — 3609010800 HC BRONCHOSCOPY ALVEOLAR LAVAGE: Performed by: INTERNAL MEDICINE

## 2019-12-31 PROCEDURE — 76705 ECHO EXAM OF ABDOMEN: CPT

## 2019-12-31 PROCEDURE — 94660 CPAP INITIATION&MGMT: CPT

## 2019-12-31 PROCEDURE — 87206 SMEAR FLUORESCENT/ACID STAI: CPT

## 2019-12-31 PROCEDURE — 80048 BASIC METABOLIC PNL TOTAL CA: CPT

## 2019-12-31 PROCEDURE — 0B9J8ZX DRAINAGE OF LEFT LOWER LUNG LOBE, VIA NATURAL OR ARTIFICIAL OPENING ENDOSCOPIC, DIAGNOSTIC: ICD-10-PCS | Performed by: INTERNAL MEDICINE

## 2019-12-31 PROCEDURE — 3609010900 HC BRONCHOSCOPY THERAPUTIC ASPIRATION INITIAL: Performed by: INTERNAL MEDICINE

## 2019-12-31 PROCEDURE — 2709999900 HC NON-CHARGEABLE SUPPLY: Performed by: INTERNAL MEDICINE

## 2019-12-31 PROCEDURE — 83735 ASSAY OF MAGNESIUM: CPT

## 2019-12-31 PROCEDURE — 2500000003 HC RX 250 WO HCPCS: Performed by: INTERNAL MEDICINE

## 2019-12-31 PROCEDURE — 87205 SMEAR GRAM STAIN: CPT

## 2019-12-31 PROCEDURE — 36415 COLL VENOUS BLD VENIPUNCTURE: CPT

## 2019-12-31 PROCEDURE — 87102 FUNGUS ISOLATION CULTURE: CPT

## 2019-12-31 PROCEDURE — 89051 BODY FLUID CELL COUNT: CPT

## 2019-12-31 PROCEDURE — 6370000000 HC RX 637 (ALT 250 FOR IP): Performed by: INTERNAL MEDICINE

## 2019-12-31 PROCEDURE — 71045 X-RAY EXAM CHEST 1 VIEW: CPT

## 2019-12-31 PROCEDURE — 99291 CRITICAL CARE FIRST HOUR: CPT | Performed by: INTERNAL MEDICINE

## 2019-12-31 PROCEDURE — 2060000000 HC ICU INTERMEDIATE R&B

## 2019-12-31 PROCEDURE — 2700000000 HC OXYGEN THERAPY PER DAY

## 2019-12-31 PROCEDURE — 88312 SPECIAL STAINS GROUP 1: CPT

## 2019-12-31 RX ORDER — MAGNESIUM HYDROXIDE 1200 MG/15ML
LIQUID ORAL CONTINUOUS PRN
Status: COMPLETED | OUTPATIENT
Start: 2019-12-31 | End: 2019-12-31

## 2019-12-31 RX ORDER — LIDOCAINE HYDROCHLORIDE 20 MG/ML
INJECTION, SOLUTION INFILTRATION; PERINEURAL PRN
Status: DISCONTINUED | OUTPATIENT
Start: 2019-12-31 | End: 2019-12-31 | Stop reason: ALTCHOICE

## 2019-12-31 RX ADMIN — Medication 10 ML: at 10:29

## 2019-12-31 RX ADMIN — VALPROIC ACID 500 MG: 250 SOLUTION ORAL at 15:39

## 2019-12-31 RX ADMIN — PIPERACILLIN AND TAZOBACTAM 3.38 G: 3; .375 INJECTION, POWDER, FOR SOLUTION INTRAVENOUS at 13:24

## 2019-12-31 RX ADMIN — PHENOBARBITAL 30 MG: 30 TABLET ORAL at 15:39

## 2019-12-31 RX ADMIN — PIPERACILLIN AND TAZOBACTAM 3.38 G: 3; .375 INJECTION, POWDER, FOR SOLUTION INTRAVENOUS at 04:23

## 2019-12-31 RX ADMIN — LEVOTHYROXINE SODIUM 88 MCG: 0.09 TABLET ORAL at 10:28

## 2019-12-31 RX ADMIN — PHENOBARBITAL 30 MG: 30 TABLET ORAL at 10:29

## 2019-12-31 ASSESSMENT — PAIN SCALES - GENERAL
PAINLEVEL_OUTOF10: 0
PAINLEVEL_OUTOF10: 0

## 2019-12-31 NOTE — PROGRESS NOTES
Page to Dr. Reginal Rubinstein: Pt needs new IV but does not have good access in the one arm that we have available. He is unable to have sticks/BPs done in the right arm. Is a central line possible? Thank you. Awaiting response.

## 2019-12-31 NOTE — PROGRESS NOTES
Nutrition Assessment (Enteral Nutrition)    Type and Reason for Visit: Reassess    Patient without nutrition x 3 days and weight loss since admit. Pt dependent on nutrition support for primary nutrition. If aggressive interventions are desired, RD strongly recommending restarting EN and adjusting free water to improve pt nutrition status. Nutrition Recommendations:  1. When able - restart EN Two Gustavo HN (Fluid restricted formula) at 240 mL 3x/day via G tube. 2. Suggest INCREASING water bolus. Recommend 156 mL every 4 hours to meet minimum fluid needs of 1 mL/1kcal. Provide 156 mL water bolus with each EN bolus + 3 additional water bolus daily. Current water flushes only providing an additional 360 ml daily. 3. Consider restart of IVF if EN continues to be held or need for PN vs palliative care consult. Nutrition Assessment: Remains nutritionally compromised d/t EN has been on hold since 12/29. Pt -3L since admit and daily avg of -550 mL per day. RD noted EN regimen only providing 360 mL/day free water. Family discussing goals of care and if Gtube feasible for feeding at this time. RD providing EN rec'd to better meet pt fluid needs. Will monitor ability to restart EN vs goals of care. Discussed with RN- per MD if pt tolerates highflow, then could restart EN today. RD monitoring closely    Malnutrition Assessment:  · Malnutrition Status: Meets the criteria for severe malnutrition  · Context: Acute illness or injury  · Findings of the 6 clinical characteristics of malnutrition (Minimum of 2 out of 6 clinical characteristics is required to make the diagnosis of moderate or severe Protein Calorie Malnutrition based on AND/ASPEN Guidelines):  1. Energy Intake-Less than or equal to 50% of estimated energy requirement, Greater than or equal to 7 days    2. Weight Loss-5% loss or greater, in 1 week  3. Fat Loss-Unable to assess,    4. Muscle Loss-Unable to assess,    5.  Fluid Accumulation-No significant

## 2019-12-31 NOTE — PROGRESS NOTES
Hospitalist Progress Note      PCP: Hansa Common    Date of Admission: 12/24/2019    Chief Complaint:  Abdominal pain     Hospital Course:      he patient is a 52 y. o. male who presents to [facility] ([facility]) with acute onset and progressive course of abdominal pain  Non verbal, discussed with staff, return for bronchoscopy, moaning more awake,    Medications:  Reviewed    Infusion Medications   Scheduled Medications    fentaNYL  1 patch Transdermal Q72H    levothyroxine  88 mcg Per G Tube Daily    PHENobarbital  30 mg Per G Tube TID    [Held by provider] valproate  500 mg Per G Tube TID    sodium chloride flush  10 mL Intravenous 2 times per day    piperacillin-tazobactam  3.375 g Intravenous Q8H     PRN Meds: ipratropium, levalbuterol, perflutren lipid microspheres, morphine, sodium chloride flush, magnesium hydroxide, ondansetron, potassium chloride **OR** potassium alternative oral replacement **OR** potassium chloride, magnesium sulfate, acetaminophen      Intake/Output Summary (Last 24 hours) at 12/31/2019 1403  Last data filed at 12/31/2019 1213  Gross per 24 hour   Intake 0 ml   Output 925 ml   Net -925 ml       Physical Exam Performed:    /72   Pulse 125   Temp 98.6 °F (37 °C) (Axillary)   Resp 16   Ht 4' 9\" (1.448 m)   Wt 117 lb 11.6 oz (53.4 kg)   SpO2 93%   BMI 25.48 kg/m²     General appearance: Quadriparesis, appears ill, on oxygen    Respiratory:  Decreased breath sounds bilaterally with few rhonchi  Cardiovascular: Regular rate and rhythm with normal S1/S2  Abdomen: firm , ?-tender, non-distended with normal bowel sounds. Musculoskeletal: No clubbing, cyanosis or edema bilaterally.   Neurologic: Quadriparesis, nonverbal.  Labs:   Recent Labs     12/29/19  0501   WBC 4.7   HGB 13.4*   HCT 38.6*   *     Recent Labs     12/29/19  0501 12/30/19  0852 12/31/19  0937    138 137   K 3.7 3.4* 3.4*   CL 87* 87* 86*   CO2 42* 48* 41*   BUN 15 18 20   CREATININE <0.5* <0.5* <0.5*   CALCIUM 8.4 8.8 8.8   PHOS 2.2* 2.0* 2.9     Recent Labs     12/29/19  0501   *   ALT 39   BILITOT 4.0*   ALKPHOS 177*     No results for input(s): INR in the last 72 hours. No results for input(s): Alferd Rocio in the last 72 hours. Urinalysis:      Lab Results   Component Value Date    NITRU Negative 12/24/2019    WBCUA 3-5 12/24/2019    RBCUA 20-50 12/24/2019    BLOODU LARGE 12/24/2019    SPECGRAV 1.015 12/24/2019    GLUCOSEU 100 12/24/2019    GLUCOSEU Negative 12/24/2009       Radiology:  XR CHEST PORTABLE   Final Result   No acute findings         US GALLBLADDER RUQ   Final Result   Gallbladder sludge, without ancillary evidence of acute cholecystitis. Common bile duct is dilated for patient age. XR CHEST PORTABLE   Final Result   Minimal bibasilar atelectasis or scarring. XR CHEST PORTABLE   Final Result   Mild ground-glass opacification in the left lower lung zone may represent   atelectasis. Asymmetric edema is not excluded. XR CHEST PORTABLE   Final Result   Mild right basilar atelectasis. Mild pneumonia is a differential   consideration. US GALLBLADDER RUQ   Final Result   1. Gallbladder sludge. XR CHEST PORTABLE   Final Result   Bibasilar and possibly left upper lobe atelectasis and/or pneumonia. CT ABDOMEN PELVIS W IV CONTRAST Additional Contrast? None   Final Result   Acute cholecystitis is suspected. XR CHEST PORTABLE   Final Result   Negative low lung volume portable examination.                  Assessment/Plan:    Active Hospital Problems    Diagnosis    Severe malnutrition ([facility] [E43]    Acute cholecystitis [K81.0]       Acute cholecystitis-per CT abdomen, ultrasound of the gallbladder revealed sludge -with abdominal pain-morphine continue Zosyn  Surgical and Gi following     No surgical procedures planned     Acute respiratory failure with hypoxemia aspiration pneumonitis,   Underwent bronchoscopy, continue oxygen and as needed BiPAP  Nutrition patient n.p.o. for few days, will start tube feedings, sister requested lactose FREE at lower rate, once  cleared by surgery  and GI    Cerebral palsy/quadriparesis /aphasia seizures-continue medication     Hyponatremia- corrected       DVT Prophylaxis: SCD  Diet: DIET TUBE FEEDING BOLUS NPO; Fluid Restricted (2 zaid HN);  Gastrostomy; 240  Code Status: DNR-CCA    Dispo - 2 -3 Days    Ila Nava MD

## 2019-12-31 NOTE — PROGRESS NOTES
abdominal issues, consider holding tube feeds or converting G tube to J tube   -Empiric atb    Due to life threatening resp failure this patient is critically ill, total critical care time involved in his care was 32 mins     Katie Schaffer MD

## 2019-12-31 NOTE — PLAN OF CARE
Problem: Nutrition  Goal: Optimal nutrition therapy  Outcome: Not Met This Shift  Note:   Nutrition Problem: Inadequate energy intake  Intervention: Food and/or Nutrient Delivery: Modify current Tube Feeding(adjust free water)  Nutritional Goals: Pt will tolerate nutrition support at goal this admission

## 2019-12-31 NOTE — PROGRESS NOTES
Page to Dr. Prashant Denson: I am so sorry to bother you. Pt's sister is very concerned about pt not receiving tube feeds and has questions regarding why we are holding the tube feed that I am unable to answer. Sister said that pt has been NPO 6 out of the 8 days that he's been here. I told her that the concern is aspiration. Are you available to speak with her? From Dr. Prashant Denson:  12/31/19 5:36 PM   I am not but ok to start it if he is off the bipap      This RN's reply. 12/31/19 5:38 PM   Ok, I will start him on the tube feed.  Thank you

## 2019-12-31 NOTE — PROGRESS NOTES
soon  2.  Pulmonary to discuss plans for bronchoscopy    Garrett Villarreal DO  7:36 PM 12/30/2019            [facility]  [id number] [facility]  [address] [place] [address]   [place], [facility]  Phone: [phone]   Phone: [phone]  Fax: [phone]       Fax: [phone]

## 2019-12-31 NOTE — CONSULTS
Nutrition Note: Verbal Consult    RD verbal consult from RN requesting \"dairy free\" EN formula. EN is still currently on hold and patient without nutrition since 12/29. RD spoke with patient sister, Cedric Keenan, who is requesting pt be switched to a \"dairy free formula\". RD reviewed current EN is lactose-free. However, high fat content could be contributing to pt GI discomforts in setting of pancreatitis, intermittent abdominal pain/possible cholecystitis. Ashwini expressed to this RD concern pt has not been feed 6 out of the 7 days of patient's admission. Requesting pt be switched to [address] formula, which hospital does have on formulary. RECOMMENDATIONS:   1. Switch to [employer] Peptid 1.5 - bolus 1 carton (325 mL each) 3 times daily to provide 975 ml total volume, 1463 kcal, 72 g protein, and 663 ml free water. 2. Water bolus 135 ml every 6 hours.      Electronically signed by Ingrid Mcguire RD, LD on 12/31/2019 at 3:38 PM

## 2019-12-31 NOTE — PROGRESS NOTES
12/31/19 0012   NIV Type   $NIV $Daily Charge   Skin Protection for O2 Device Yes   NIV Started/Stopped On   Equipment Type v60   Mode Bilevel   Mask Type Full face mask   Mask Size Medium   Settings/Measurements   IPAP 15 cmH20   CPAP/EPAP 6 cmH2O   Rate Ordered 12   Resp 14   FiO2  40 %   Vt Exhaled 337 mL   Minute Volume 5 Liters   Mask Leak (lpm) 0 lpm   Comfort Level Good   Using Accessory Muscles No   SpO2 97   Breath Sounds   Right Upper Lobe Clear   Right Middle Lobe Diminished   Right Lower Lobe Diminished   Left Upper Lobe Diminished   Left Lower Lobe Diminished   Alarm Settings   Alarms On Y   Press Low Alarm 6 cmH2O   High Pressure Alarm 30 cmH2O   Delay Alarm 20 sec(s)   Resp Rate Low Alarm 6   High Respiratory Rate 40 br/min

## 2019-12-31 NOTE — CARE COORDINATION
Case Management/Follow up:    Chart reviewed for length of stay. Hospital day # 7  Unit:  C4  Diagnosis and current status as per MD progress: Lengthy discussion with the family at the bedside regarding his current situation, potential benefit from bronchoscopy, and high risk for needing intubation if not improving in the next 1-2 days. They agreed to proceed  -Intermittent bipap, wean FIO2  -Repeat CXR  -Serial ABGs  -Diuresis as renally tolerated  -?component of reflux aspiration with abdominal issues, consider holding tube feeds or converting G tube to J tube   -Empiric atb  Anticipated d/c date: unknown   Expected plan for discharge: Placed call to Karey Sandy with [facility] to inquire if patient meets criteria for LTAC. She states she is waiting for the rev codes and if he meets, will talk to the family regarding transfer to [facility] No precert needed due to traditional Medicare.

## 2020-01-01 LAB
ANION GAP SERPL CALCULATED.3IONS-SCNC: 9 MMOL/L (ref 3–16)
BUN BLDV-MCNC: 16 MG/DL (ref 7–20)
CALCIUM SERPL-MCNC: 8.4 MG/DL (ref 8.3–10.6)
CHLORIDE BLD-SCNC: 88 MMOL/L (ref 99–110)
CO2: 40 MMOL/L (ref 21–32)
CREAT SERPL-MCNC: <0.5 MG/DL (ref 0.9–1.3)
GFR AFRICAN AMERICAN: >60
GFR NON-AFRICAN AMERICAN: >60
GLUCOSE BLD-MCNC: 116 MG/DL (ref 70–99)
GLUCOSE BLD-MCNC: 116 MG/DL (ref 70–99)
GLUCOSE BLD-MCNC: 117 MG/DL (ref 70–99)
GLUCOSE BLD-MCNC: 149 MG/DL (ref 70–99)
GLUCOSE BLD-MCNC: 186 MG/DL (ref 70–99)
MAGNESIUM: 2.1 MG/DL (ref 1.8–2.4)
PERFORMED ON: ABNORMAL
PHOSPHORUS: 2.5 MG/DL (ref 2.5–4.9)
POTASSIUM SERPL-SCNC: 2.9 MMOL/L (ref 3.5–5.1)
SODIUM BLD-SCNC: 137 MMOL/L (ref 136–145)

## 2020-01-01 PROCEDURE — 36415 COLL VENOUS BLD VENIPUNCTURE: CPT

## 2020-01-01 PROCEDURE — 84100 ASSAY OF PHOSPHORUS: CPT

## 2020-01-01 PROCEDURE — 6370000000 HC RX 637 (ALT 250 FOR IP): Performed by: INTERNAL MEDICINE

## 2020-01-01 PROCEDURE — 2500000003 HC RX 250 WO HCPCS: Performed by: HOSPITALIST

## 2020-01-01 PROCEDURE — 80048 BASIC METABOLIC PNL TOTAL CA: CPT

## 2020-01-01 PROCEDURE — 83735 ASSAY OF MAGNESIUM: CPT

## 2020-01-01 PROCEDURE — 6360000002 HC RX W HCPCS: Performed by: INTERNAL MEDICINE

## 2020-01-01 PROCEDURE — 2580000003 HC RX 258: Performed by: INTERNAL MEDICINE

## 2020-01-01 PROCEDURE — 2700000000 HC OXYGEN THERAPY PER DAY

## 2020-01-01 PROCEDURE — 2060000000 HC ICU INTERMEDIATE R&B

## 2020-01-01 PROCEDURE — 99233 SBSQ HOSP IP/OBS HIGH 50: CPT | Performed by: INTERNAL MEDICINE

## 2020-01-01 PROCEDURE — 2580000003 HC RX 258

## 2020-01-01 PROCEDURE — 94761 N-INVAS EAR/PLS OXIMETRY MLT: CPT

## 2020-01-01 RX ORDER — SODIUM CHLORIDE 9 MG/ML
INJECTION, SOLUTION INTRAVENOUS
Status: COMPLETED
Start: 2020-01-01 | End: 2020-01-01

## 2020-01-01 RX ORDER — DEXTROSE, SODIUM CHLORIDE, AND POTASSIUM CHLORIDE 5; .45; .15 G/100ML; G/100ML; G/100ML
INJECTION INTRAVENOUS CONTINUOUS
Status: DISCONTINUED | OUTPATIENT
Start: 2020-01-01 | End: 2020-01-02

## 2020-01-01 RX ADMIN — VALPROIC ACID 500 MG: 250 SOLUTION ORAL at 21:05

## 2020-01-01 RX ADMIN — PIPERACILLIN AND TAZOBACTAM 3.38 G: 3; .375 INJECTION, POWDER, FOR SOLUTION INTRAVENOUS at 04:31

## 2020-01-01 RX ADMIN — LEVOTHYROXINE SODIUM 88 MCG: 0.09 TABLET ORAL at 06:44

## 2020-01-01 RX ADMIN — POTASSIUM CHLORIDE 10 MEQ: 7.46 INJECTION, SOLUTION INTRAVENOUS at 16:22

## 2020-01-01 RX ADMIN — POTASSIUM CHLORIDE 10 MEQ: 7.46 INJECTION, SOLUTION INTRAVENOUS at 13:12

## 2020-01-01 RX ADMIN — PHENOBARBITAL 30 MG: 30 TABLET ORAL at 21:05

## 2020-01-01 RX ADMIN — VALPROIC ACID 500 MG: 250 SOLUTION ORAL at 01:32

## 2020-01-01 RX ADMIN — PHENOBARBITAL 30 MG: 30 TABLET ORAL at 08:28

## 2020-01-01 RX ADMIN — PIPERACILLIN AND TAZOBACTAM 3.38 G: 3; .375 INJECTION, POWDER, FOR SOLUTION INTRAVENOUS at 13:48

## 2020-01-01 RX ADMIN — SODIUM CHLORIDE 250 ML: 9 INJECTION, SOLUTION INTRAVENOUS at 04:30

## 2020-01-01 RX ADMIN — VALPROIC ACID 500 MG: 250 SOLUTION ORAL at 08:28

## 2020-01-01 RX ADMIN — POTASSIUM CHLORIDE 10 MEQ: 7.46 INJECTION, SOLUTION INTRAVENOUS at 08:29

## 2020-01-01 RX ADMIN — POTASSIUM CHLORIDE, DEXTROSE MONOHYDRATE AND SODIUM CHLORIDE: 150; 5; 450 INJECTION, SOLUTION INTRAVENOUS at 17:31

## 2020-01-01 RX ADMIN — PIPERACILLIN AND TAZOBACTAM 3.38 G: 3; .375 INJECTION, POWDER, FOR SOLUTION INTRAVENOUS at 21:03

## 2020-01-01 RX ADMIN — POTASSIUM CHLORIDE 10 MEQ: 7.46 INJECTION, SOLUTION INTRAVENOUS at 18:40

## 2020-01-01 RX ADMIN — VALPROIC ACID 500 MG: 250 SOLUTION ORAL at 13:49

## 2020-01-01 RX ADMIN — PHENOBARBITAL 30 MG: 30 TABLET ORAL at 01:32

## 2020-01-01 RX ADMIN — POTASSIUM CHLORIDE 10 MEQ: 7.46 INJECTION, SOLUTION INTRAVENOUS at 10:08

## 2020-01-01 RX ADMIN — POTASSIUM CHLORIDE 10 MEQ: 7.46 INJECTION, SOLUTION INTRAVENOUS at 06:55

## 2020-01-01 RX ADMIN — PHENOBARBITAL 30 MG: 30 TABLET ORAL at 13:49

## 2020-01-01 ASSESSMENT — PAIN SCALES - GENERAL
PAINLEVEL_OUTOF10: 1
PAINLEVEL_OUTOF10: 0

## 2020-01-01 NOTE — OP NOTE
Preoperative Diagnosis:  1. Acute resp failure  2. Mucous plugging, aspiration pneumonia     Postoperative Diagnosis:  1. Acute resp failure  2. Mucous plugging, aspiration pneumonia     Procedure Performed:  1. Flexible bronchoscopy  2. Therapeutic aspiration of endobronchial secretions  3. Bronchoalveolar lavage of left lower lobe    Findings:  1. Improved airway patency after aspiration of secretions from the tracheobronchial tree    Recommendations:  1. Await results of collected specimen    Indications:  Radha Cantu is a pleasant 44year old male who has had ongoing resp failure despite pulm toileting, atb, and bipap support. Bronchoscopy indicated for further evaluation of findings and to assist with pulmonary toileting. After review of the procedure and associated risks consent was obtained to proceed. ASA 3, Mallampati 2    Procedure Details: The patient was correctly identified as  Radha Cantu, a safety timeout was performed. After topical lidocaine an adult therapeutic bronchoscope was inserted through the mouth and into the airways. Tenacious white secretions were aspirated from the tracheobronchial tree with subsequent improved airway patency. An airway exam was then performed, all subsegments were well visualized and did not appear to show any acute abnormality. The bronchoscope was wedged into the left lower lobe and a bronchoalveolar lavage was performed. Bronchoscope was withdrawn and the procedure was terminated. There was no immediate complications, the patient tolerated the procedure well. Estimated blood loss was less than 1 cc.      Specimens:  LLL BAL

## 2020-01-01 NOTE — PROGRESS NOTES
Pulmonary & Critical Care Inpatient Progress Note   Balbina Martínez MD     REASON FOR TODAY'S VISIT:  Acute resp failure    SUBJECTIVE:   Remains on high flow oxygen off bipap  Suctioned significant mucous plugging from left lung yesterday during bronchoscopy    Scheduled Meds:   fentaNYL  1 patch Transdermal Q72H    levothyroxine  88 mcg Per G Tube Daily    PHENobarbital  30 mg Per G Tube TID    valproate  500 mg Per G Tube TID    sodium chloride flush  10 mL Intravenous 2 times per day    piperacillin-tazobactam  3.375 g Intravenous Q8H       Continuous Infusions:      PRN Meds:  ipratropium, levalbuterol, perflutren lipid microspheres, morphine, sodium chloride flush, magnesium hydroxide, ondansetron, potassium chloride **OR** potassium alternative oral replacement **OR** potassium chloride, magnesium sulfate, acetaminophen    ALLERGIES:  Patient has No Known Allergies. Objective:   PHYSICAL EXAM:  /71   Pulse 119   Temp 98 °F (36.7 °C) (Oral)   Resp 18   Ht 4' 9\" (1.448 m)   Wt 117 lb 11.6 oz (53.4 kg)   SpO2 96%   BMI 25.48 kg/m²    Physical Exam  Constitutional:       General: He is not in acute distress. Appearance: He is well-developed. He is not diaphoretic. HENT:      Head: Normocephalic and atraumatic. Mouth/Throat:      Pharynx: No oropharyngeal exudate. Eyes:      Pupils: Pupils are equal, round, and reactive to light. Neck:      Musculoskeletal: Neck supple. Vascular: No JVD. Cardiovascular:      Heart sounds: Normal heart sounds. No murmur. No friction rub. No gallop. Pulmonary:      Effort: Pulmonary effort is normal.      Breath sounds: No wheezing or rales. Abdominal:      General: Bowel sounds are normal. There is no distension. Palpations: Abdomen is soft. Tenderness: There is no tenderness. Lymphadenopathy:      Cervical: No cervical adenopathy. Skin:     General: Skin is warm and dry. Findings: No rash.    Neurological:      Mental

## 2020-01-01 NOTE — PROGRESS NOTES
Hospitalist Progress Note      PCP: Wayne Leonardo    Date of Admission: 12/24/2019    Chief Complaint:  Abdominal pain     Hospital Course:      he patient is a 52 y. o. male who presents to [facility]) with acute onset and progressive course of abdominal pain  Non verbal, discussed with staff    Medications:  Reviewed    Infusion Medications   Scheduled Medications    fentaNYL  1 patch Transdermal Q72H    levothyroxine  88 mcg Per G Tube Daily    PHENobarbital  30 mg Per G Tube TID    valproate  500 mg Per G Tube TID    sodium chloride flush  10 mL Intravenous 2 times per day    piperacillin-tazobactam  3.375 g Intravenous Q8H     PRN Meds: ipratropium, levalbuterol, perflutren lipid microspheres, morphine, sodium chloride flush, magnesium hydroxide, ondansetron, potassium chloride **OR** potassium alternative oral replacement **OR** potassium chloride, magnesium sulfate, acetaminophen      Intake/Output Summary (Last 24 hours) at 1/1/2020 1125  Last data filed at 1/1/2020 0826  Gross per 24 hour   Intake 775 ml   Output 410 ml   Net 365 ml       Physical Exam Performed:    /71   Pulse 119   Temp 98 °F (36.7 °C) (Oral)   Resp 18   Ht 4' 9\" (1.448 m)   Wt 117 lb 11.6 oz (53.4 kg)   SpO2 96%   BMI 25.48 kg/m²     General appearance: Quadriparesis, appears ill, on oxygen  Respiratory:  Decreased breath sounds bilaterally with few rhonchi  Cardiovascular: Regular rate and rhythm with normal S1/S2  Abdomen: firm , ?-tender, non-distended with normal bowel sounds. Musculoskeletal: No clubbing, cyanosis or edema bilaterally. Neurologic: Quadriparesis, nonverbal.  Labs:   No results for input(s): WBC, HGB, HCT, PLT in the last 72 hours.   Recent Labs     12/30/19  0852 12/31/19  0937 01/01/20  0503    137 137   K 3.4* 3.4* 2.9*   CL 87* 86* 88*   CO2 48* 41* 40*   BUN 18 20 16   CREATININE <0.5* <0.5* <0.5*   CALCIUM 8.8 8.8 8.4   PHOS 2.0* 2.9 2.5     No results for input(s): AST, ALT,

## 2020-01-01 NOTE — PROGRESS NOTES
Page to Dr. Ronda Tucker: She said she is available at 2:30 tomorrow. After we took the ladd out she was concerned with how dark his urine is. Would you like him on any IV fluids or not? Thanks.

## 2020-01-01 NOTE — PROGRESS NOTES
PROGRESS NOTE  S:49 yrs Patient  admitted on 12/24/2019 with Acute cholecystitis [K81.0]  Acute cholecystitis [K81.0] . Respiratory status improving. Off BiPap. RUQ US demonstrates a 1 cm bile duct. LFTs continue to look obstructive. Current Hospital Schedued Meds   fentaNYL  1 patch Transdermal Q72H    levothyroxine  88 mcg Per G Tube Daily    PHENobarbital  30 mg Per G Tube TID    valproate  500 mg Per G Tube TID    sodium chloride flush  10 mL Intravenous 2 times per day    piperacillin-tazobactam  3.375 g Intravenous Q8H     Current Hospital IV Meds    Current Hospital PRN Meds  ipratropium, levalbuterol, perflutren lipid microspheres, morphine, sodium chloride flush, magnesium hydroxide, ondansetron, potassium chloride **OR** potassium alternative oral replacement **OR** potassium chloride, magnesium sulfate, acetaminophen    Exam:   Vitals:    12/31/19 1726   BP: 123/75   Pulse: 108   Resp: 18   Temp: 99.1 °F (37.3 °C)   SpO2: 100%     I/O last 3 completed shifts:   In: 0   Out: 975 [Urine:975]   General appearance: appears stated age and cooperative  HEENT: PERRLA  Neck: no adenopathy, no carotid bruit, no JVD, supple, symmetrical, trachea midline and thyroid not enlarged, symmetric, no tenderness/mass/nodules  Lungs: clear to auscultation bilaterally  Heart: regular rate and rhythm, S1, S2 normal, no murmur, click, rub or gallop  Abdomen: soft, non-tender; bowel sounds normal; no masses,  no organomegaly  Extremities: extremities normal, atraumatic, no cyanosis or edema     Labs:  CBC:   Recent Labs     12/29/19  0501   WBC 4.7   HGB 13.4*   HCT 38.6*   .3*   *     BMP:   Recent Labs     12/29/19  0501 12/30/19  0852 12/31/19  0937    138 137   K 3.7 3.4* 3.4*   CL 87* 87* 86*   CO2 42* 48* 41*   PHOS 2.2* 2.0* 2.9   BUN 15 18 20   CREATININE <0.5* <0.5* <0.5*     LIVER PROFILE:   Recent Labs     12/29/19  0501   *   ALT 39   PROT Scoliotic curvature of the thoracic lumbar spine and lumbar fixation are again noted. Cardiac silhouette is normal.  There is minimal linear opacity in the bases. There is no pneumothorax. Minimal bibasilar atelectasis or scarring. Hospital Problems           Last Modified POA    Acute cholecystitis 12/24/2019 Yes    Severe malnutrition ([address] 12/31/2019 No         Impression:  43 yo male with cerebral palsy, epilepsy, pancreatitis, intermittent fevers and abdominal pain admitted with concerns for cholecystitis. Hospital course complicated by respiratory distress. Recommendation:  1. Patient with two strong risk factors for choledocholithiasis bilirubin of 4 and dilated bile duct. Could make justification for ERCP. Typically would intubate for that procedure although could consider MAC while in left lateral or on back. Can also consider converting G tube to [address] tube although has much higher risk of clogging and would want to upsize peg to accommodate a 12 Fr J tube.         Shayy Triana DO  10:03 PM 12/31/2019            [facility] Office  [address] [place] [address] E   [place], [facility]  Phone: [phone]   Phone: [phone]  Fax: [phone]       Fax: [phone]

## 2020-01-01 NOTE — FLOWSHEET NOTE
Dysuria HALINA   Anus/Rectum   Anus/Rectum (WDL) WDL   Psychosocial   Psychosocial (WDL) X   Patient Behaviors Calm; Not interactive

## 2020-01-02 ENCOUNTER — APPOINTMENT (OUTPATIENT)
Dept: GENERAL RADIOLOGY | Age: 50
DRG: 444 | End: 2020-01-02
Payer: MEDICARE

## 2020-01-02 LAB
ADENOVIRUS PCR: NOT DETECTED
ALBUMIN SERPL-MCNC: 1.7 G/DL (ref 3.4–5)
ALP BLD-CCNC: 173 U/L (ref 40–129)
ALT SERPL-CCNC: 52 U/L (ref 10–40)
ANION GAP SERPL CALCULATED.3IONS-SCNC: 6 MMOL/L (ref 3–16)
AST SERPL-CCNC: 237 U/L (ref 15–37)
BASE EXCESS VENOUS: 14 MMOL/L (ref -3–3)
BILIRUB SERPL-MCNC: 3.2 MG/DL (ref 0–1)
BILIRUBIN DIRECT: 2.5 MG/DL (ref 0–0.3)
BILIRUBIN, INDIRECT: 0.7 MG/DL (ref 0–1)
BUN BLDV-MCNC: 12 MG/DL (ref 7–20)
CALCIUM SERPL-MCNC: 7.6 MG/DL (ref 8.3–10.6)
CARBOXYHEMOGLOBIN: 1.4 % (ref 0–1.5)
CHLORIDE BLD-SCNC: 87 MMOL/L (ref 99–110)
CO2: 37 MMOL/L (ref 21–32)
CREAT SERPL-MCNC: <0.5 MG/DL (ref 0.9–1.3)
CULTURE, RESPIRATORY: NORMAL
GFR AFRICAN AMERICAN: >60
GFR NON-AFRICAN AMERICAN: >60
GLUCOSE BLD-MCNC: 100 MG/DL (ref 70–99)
GLUCOSE BLD-MCNC: 102 MG/DL (ref 70–99)
GLUCOSE BLD-MCNC: 114 MG/DL (ref 70–99)
GLUCOSE BLD-MCNC: 116 MG/DL (ref 70–99)
GLUCOSE BLD-MCNC: 138 MG/DL (ref 70–99)
GRAM STAIN RESULT: NORMAL
HCO3 VENOUS: 40 MMOL/L (ref 23–29)
HUMAN METAPNEUMOVIRUS PCR: NOT DETECTED
INFLUENZA A: NOT DETECTED
INFLUENZA B: NOT DETECTED
MAGNESIUM: 2 MG/DL (ref 1.8–2.4)
METHEMOGLOBIN VENOUS: 0.8 %
O2 CONTENT, VEN: 17 VOL %
O2 SAT, VEN: 99 %
O2 THERAPY: ABNORMAL
PARAINFLUENZA 1 PCR: NOT DETECTED
PARAINFLUENZA 2 PCR: NOT DETECTED
PARAINFLUENZA 3 PCR: NOT DETECTED
PARAINFLUENZA 4 PCR: NOT DETECTED
PCO2, VEN: 56.9 MMHG (ref 40–50)
PERFORMED ON: ABNORMAL
PH VENOUS: 7.46 (ref 7.35–7.45)
PHOSPHORUS: 1.9 MG/DL (ref 2.5–4.9)
PO2, VEN: 120.9 MMHG (ref 25–40)
POTASSIUM SERPL-SCNC: 3.3 MMOL/L (ref 3.5–5.1)
PRO-BNP: 276 PG/ML (ref 0–124)
RHINO/ENTEROVIRUS PCR: NOT DETECTED
RSV BY PCR: NOT DETECTED
RSV SOURCE: NORMAL
SODIUM BLD-SCNC: 130 MMOL/L (ref 136–145)
TCO2 CALC VENOUS: 42 MMOL/L
TOTAL PROTEIN: 5.5 G/DL (ref 6.4–8.2)

## 2020-01-02 PROCEDURE — 36415 COLL VENOUS BLD VENIPUNCTURE: CPT

## 2020-01-02 PROCEDURE — 80069 RENAL FUNCTION PANEL: CPT

## 2020-01-02 PROCEDURE — 2060000000 HC ICU INTERMEDIATE R&B

## 2020-01-02 PROCEDURE — 71045 X-RAY EXAM CHEST 1 VIEW: CPT

## 2020-01-02 PROCEDURE — 83880 ASSAY OF NATRIURETIC PEPTIDE: CPT

## 2020-01-02 PROCEDURE — 2580000003 HC RX 258: Performed by: INTERNAL MEDICINE

## 2020-01-02 PROCEDURE — 99233 SBSQ HOSP IP/OBS HIGH 50: CPT | Performed by: INTERNAL MEDICINE

## 2020-01-02 PROCEDURE — 6360000002 HC RX W HCPCS: Performed by: INTERNAL MEDICINE

## 2020-01-02 PROCEDURE — 2580000003 HC RX 258: Performed by: NURSE PRACTITIONER

## 2020-01-02 PROCEDURE — 80076 HEPATIC FUNCTION PANEL: CPT

## 2020-01-02 PROCEDURE — 2500000003 HC RX 250 WO HCPCS: Performed by: HOSPITALIST

## 2020-01-02 PROCEDURE — 83735 ASSAY OF MAGNESIUM: CPT

## 2020-01-02 PROCEDURE — 6370000000 HC RX 637 (ALT 250 FOR IP): Performed by: INTERNAL MEDICINE

## 2020-01-02 PROCEDURE — 82803 BLOOD GASES ANY COMBINATION: CPT

## 2020-01-02 RX ORDER — SODIUM CHLORIDE 0.9 % (FLUSH) 0.9 %
10 SYRINGE (ML) INJECTION EVERY 12 HOURS SCHEDULED
Status: CANCELLED | OUTPATIENT
Start: 2020-01-02

## 2020-01-02 RX ORDER — SODIUM CHLORIDE 0.9 % (FLUSH) 0.9 %
10 SYRINGE (ML) INJECTION PRN
Status: CANCELLED | OUTPATIENT
Start: 2020-01-02

## 2020-01-02 RX ORDER — LORAZEPAM 0.5 MG/1
0.5 TABLET ORAL EVERY 6 HOURS PRN
Status: DISCONTINUED | OUTPATIENT
Start: 2020-01-02 | End: 2020-01-03 | Stop reason: HOSPADM

## 2020-01-02 RX ORDER — DEXTROSE, SODIUM CHLORIDE, AND POTASSIUM CHLORIDE 5; .45; .15 G/100ML; G/100ML; G/100ML
INJECTION INTRAVENOUS CONTINUOUS
Status: ACTIVE | OUTPATIENT
Start: 2020-01-02 | End: 2020-01-02

## 2020-01-02 RX ORDER — SODIUM CHLORIDE, SODIUM LACTATE, POTASSIUM CHLORIDE, CALCIUM CHLORIDE 600; 310; 30; 20 MG/100ML; MG/100ML; MG/100ML; MG/100ML
INJECTION, SOLUTION INTRAVENOUS CONTINUOUS
Status: CANCELLED | OUTPATIENT
Start: 2020-01-02

## 2020-01-02 RX ORDER — 0.9 % SODIUM CHLORIDE 0.9 %
500 INTRAVENOUS SOLUTION INTRAVENOUS ONCE
Status: COMPLETED | OUTPATIENT
Start: 2020-01-02 | End: 2020-01-02

## 2020-01-02 RX ORDER — SODIUM CHLORIDE 9 MG/ML
INJECTION, SOLUTION INTRAVENOUS
Status: DISPENSED
Start: 2020-01-02 | End: 2020-01-03

## 2020-01-02 RX ORDER — MORPHINE SULFATE 2 MG/ML
1 INJECTION, SOLUTION INTRAMUSCULAR; INTRAVENOUS EVERY 4 HOURS PRN
Status: DISCONTINUED | OUTPATIENT
Start: 2020-01-02 | End: 2020-01-03 | Stop reason: HOSPADM

## 2020-01-02 RX ADMIN — PHENOBARBITAL 30 MG: 30 TABLET ORAL at 09:39

## 2020-01-02 RX ADMIN — DEXTROSE MONOHYDRATE, SODIUM CHLORIDE, AND POTASSIUM CHLORIDE: 50; 4.5; 1.49 INJECTION, SOLUTION INTRAVENOUS at 11:58

## 2020-01-02 RX ADMIN — LEVOTHYROXINE SODIUM 88 MCG: 0.09 TABLET ORAL at 06:25

## 2020-01-02 RX ADMIN — Medication 10 ML: at 20:27

## 2020-01-02 RX ADMIN — PHENOBARBITAL 30 MG: 30 TABLET ORAL at 14:00

## 2020-01-02 RX ADMIN — VALPROIC ACID 500 MG: 250 SOLUTION ORAL at 09:39

## 2020-01-02 RX ADMIN — SODIUM CHLORIDE 500 ML: 9 INJECTION, SOLUTION INTRAVENOUS at 20:25

## 2020-01-02 RX ADMIN — POTASSIUM BICARBONATE 40 MEQ: 782 TABLET, EFFERVESCENT ORAL at 06:25

## 2020-01-02 RX ADMIN — PIPERACILLIN AND TAZOBACTAM 3.38 G: 3; .375 INJECTION, POWDER, FOR SOLUTION INTRAVENOUS at 13:29

## 2020-01-02 RX ADMIN — PIPERACILLIN AND TAZOBACTAM 3.38 G: 3; .375 INJECTION, POWDER, FOR SOLUTION INTRAVENOUS at 20:23

## 2020-01-02 RX ADMIN — VALPROIC ACID 500 MG: 250 SOLUTION ORAL at 14:00

## 2020-01-02 RX ADMIN — MORPHINE SULFATE 2 MG: 2 INJECTION, SOLUTION INTRAMUSCULAR; INTRAVENOUS at 08:13

## 2020-01-02 RX ADMIN — VALPROIC ACID 500 MG: 250 SOLUTION ORAL at 20:23

## 2020-01-02 RX ADMIN — PIPERACILLIN AND TAZOBACTAM 3.38 G: 3; .375 INJECTION, POWDER, FOR SOLUTION INTRAVENOUS at 05:02

## 2020-01-02 ASSESSMENT — PAIN SCALES - GENERAL
PAINLEVEL_OUTOF10: 0
PAINLEVEL_OUTOF10: 1

## 2020-01-02 NOTE — PROGRESS NOTES
energy intake  · Etiology: related to Insufficient energy/nutrient consumption     Signs and symptoms:  as evidenced by NPO status due to medical condition    Objective Information:  · Nutrition-Focused Physical Findings: BM x 2 on 1/1/19  · Wound Type: Stage II, Pressure Ulcer  · Current Nutrition Therapies:  · Oral Diet Orders: NPO   · Oral Diet intake: NPO  · Oral Nutrition Supplement (ONS) Orders: None  · ONS intake: NPO  · Tube Feeding (TF) Orders:   · Feeding Route: Gastrostomy  · Formula: [employer] Peptide 1.5)  · Rate (ml/hr): HELD today    · Volume (ml/day): 975 ml goal volume of tube feeding formula  · Duration: Bolus  · Additives/Modulars:    · Water Flushes: 120 ml per bolus (x3 daily) = 360 ml water daily(needs an additional 576 ml daily to meet min fluid needs)  · Current TF & Flush Orders Provides: same as below  · Goal TF & Flush Orders Provides: [employer] 325 ml tid providing provide 975 ml total volume, 1463 kcal, 72 g protein, and 663 ml free water. Water flush 150 ml every 4 hours   · Additional Calories:    · Anthropometric Measures:  · Ht: 4' 9\" (144.8 cm)   · Current Body Wt: 119 lb 3 oz (54.1 kg)  · Admission Body Wt: 125 lb (56.7 kg)  · Weight Change: HALINA d/t limited wt history.     · BMI Classification: BMI 25.0 - 29.9 Overweight    Nutrition Interventions:   Continue current Tube Feeding  Continued Inpatient Monitoring    Nutrition Evaluation:   · Evaluation: Progressing toward goals   · Goals: Pt will tolerate nutrition support at goal this admission    · Monitoring: TF Intake, TF Tolerance, Pertinent Labs, Weight      Electronically signed by Elliot Geiger RD, LD on 1/2/20 at 12:17 PM    Contact Number: Office: [phone]; [address]: [phone]

## 2020-01-02 NOTE — PLAN OF CARE
Problem: Falls - Risk of:  Goal: Will remain free from falls  Description  Will remain free from falls  1/1/2020 1928 by Rickey Snyder RN  Outcome: Ongoing  1/1/2020 1054 by Oneida Birch RN  Outcome: Ongoing  Goal: Absence of physical injury  Description  Absence of physical injury  1/1/2020 1928 by Rickey Snyder RN  Outcome: Ongoing  1/1/2020 1054 by Oneida Birch RN  Outcome: Ongoing     Problem: Risk for Impaired Skin Integrity  Goal: Tissue integrity - skin and mucous membranes  Description  Structural intactness and normal physiological function of skin and  mucous membranes.   1/1/2020 1928 by Rickey Snyder RN  Outcome: Ongoing  1/1/2020 1054 by Oneida Birch RN  Outcome: Ongoing     Problem: Pain:  Goal: Pain level will decrease  Description  Pain level will decrease  1/1/2020 1928 by Rickey Snyder RN  Outcome: Ongoing  1/1/2020 1054 by Oneida Birch RN  Outcome: Ongoing  Goal: Control of acute pain  Description  Control of acute pain  1/1/2020 1928 by Rickey Snyder RN  Outcome: Ongoing  1/1/2020 1054 by Oneida Birch RN  Outcome: Ongoing  Goal: Control of chronic pain  Description  Control of chronic pain  1/1/2020 1928 by Rickey Snyder RN  Outcome: Ongoing  1/1/2020 1054 by Oneida Birch RN  Outcome: Ongoing     Problem: Nutrition  Goal: Optimal nutrition therapy  1/1/2020 1928 by Rickey Snyder RN  Outcome: Ongoing  1/1/2020 1054 by Oneida Birch RN  Outcome: Ongoing

## 2020-01-02 NOTE — PROGRESS NOTES
PROGRESS NOTE  S:49 yrs Patient  admitted on 12/24/2019 with Acute cholecystitis [K81.0]  Acute cholecystitis [K81.0] . Respiratory status improving. Discussed with Dr. Rubina Nieves. Current Hospital Schedued Meds   fentaNYL  1 patch Transdermal Q72H    levothyroxine  88 mcg Per G Tube Daily    PHENobarbital  30 mg Per G Tube TID    valproate  500 mg Per G Tube TID    sodium chloride flush  10 mL Intravenous 2 times per day    piperacillin-tazobactam  3.375 g Intravenous Q8H     Current Hospital IV Meds   dextrose 5% and 0.45% NaCl with KCl 20 mEq 50 mL/hr at 01/01/20 1731     Current Hospital PRN Meds  ipratropium, levalbuterol, perflutren lipid microspheres, morphine, sodium chloride flush, magnesium hydroxide, ondansetron, potassium chloride **OR** potassium alternative oral replacement **OR** potassium chloride, magnesium sulfate, acetaminophen    Exam:   Vitals:    01/02/20 0737   BP: 109/76   Pulse: 103   Resp: 18   Temp: 97.4 °F (36.3 °C)   SpO2: 97%     I/O last 3 completed shifts: In: 1045 [NG/GT:1045]  Out: 0    General appearance: appears stated age and cooperative  HEENT: PERRLA  Neck: no adenopathy, no carotid bruit, no JVD, supple, symmetrical, trachea midline and thyroid not enlarged, symmetric, no tenderness/mass/nodules  Lungs: clear to auscultation bilaterally  Heart: regular rate and rhythm, S1, S2 normal, no murmur, click, rub or gallop  Abdomen: soft, non-tender; bowel sounds normal; no masses,  no organomegaly  Extremities: extremities normal, atraumatic, no cyanosis or edema     Labs:  CBC: No results for input(s): WBC, HGB, HCT, MCV, PLT in the last 72 hours.   BMP:   Recent Labs     12/31/19  0937 01/01/20  0503 01/02/20  0524    137 130*   K 3.4* 2.9* 3.3*   CL 86* 88* 87*   CO2 41* 40* 37*   PHOS 2.9 2.5 1.9*   BUN 20 16 12   CREATININE <0.5* <0.5* <0.5*     LIVER PROFILE:   Recent Labs     01/02/20  0524   *   ALT 52*   PROT 5.5* BILIDIR 2.5*   BILITOT 3.2*   ALKPHOS 173*     PT/INR: No results for input(s): INR in the last 72 hours. Invalid input(s): PT    IMAGING:  Xr Chest Portable    Result Date: 1/2/2020  EXAMINATION: ONE XRAY VIEW OF THE CHEST 1/2/2020 5:49 am COMPARISON: 12/31/2019 HISTORY: ORDERING SYSTEM PROVIDED HISTORY: resp failure TECHNOLOGIST PROVIDED HISTORY: Reason for exam:->resp failure Reason for Exam: resp failure Follow-up, respiratory failure. FINDINGS: The cardiomediastinal silhouette is normal in size. Lung volumes are low. There is stable asymmetric elevation of the right diaphragm. Bibasilar atelectasis. Stable scarring present at the right costophrenic angle. Stable chest.  Low lung volumes without evidence of superimposed acute process. Xr Chest Portable    Result Date: 12/31/2019  EXAMINATION: ONE XRAY VIEW OF THE CHEST 12/31/2019 11:13 am COMPARISON: None. HISTORY: ORDERING SYSTEM PROVIDED HISTORY: pneumonia TECHNOLOGIST PROVIDED HISTORY: Reason for exam:->pneumonia Reason for Exam: pneumonia Acuity: Unknown Type of Exam: Unknown FINDINGS: Scoliosis again noted. Cardiac silhouette is normal in size. No pneumothorax. No pleural effusion. No consolidation. No acute findings        Hospital Problems           Last Modified POA    Acute cholecystitis 12/24/2019 Yes    Severe malnutrition ([address] 12/31/2019 No         Impression:  45 yo male with cerebral palsy, epilepsy, pancreatitis, intermittent fevers, abdominal pain and cholecystitis    Recommendation:  1. Could justify ERCP based on duct dilation, enzymes, and history of fevers, pancreatitis however need pulmonary clearance and discussion of goals of care.       Adán Camp DO  9:17 AM 1/2/2020            [facility]    [address]      [address] [place] [address]     Phone: [phone]     Fax: [phone]

## 2020-01-02 NOTE — PROGRESS NOTES
Pulmonary & Critical Care Inpatient Progress Note   Marc Guzman MD     REASON FOR TODAY'S VISIT:  Acute resp failure    SUBJECTIVE:   Decreased oxygen requirements   No acute events   on tube feeds    Scheduled Meds:   fentaNYL  1 patch Transdermal Q72H    levothyroxine  88 mcg Per G Tube Daily    PHENobarbital  30 mg Per G Tube TID    valproate  500 mg Per G Tube TID    sodium chloride flush  10 mL Intravenous 2 times per day    piperacillin-tazobactam  3.375 g Intravenous Q8H       Continuous Infusions:   dextrose 5% and 0.45% NaCl with KCl 20 mEq         PRN Meds:  ipratropium, levalbuterol, perflutren lipid microspheres, morphine, sodium chloride flush, magnesium hydroxide, ondansetron, potassium chloride **OR** potassium alternative oral replacement **OR** potassium chloride, magnesium sulfate, acetaminophen    ALLERGIES:  Patient has No Known Allergies. Objective:   PHYSICAL EXAM:  /76   Pulse 103   Temp 97.4 °F (36.3 °C) (Oral)   Resp 18   Ht 4' 9\" (1.448 m)   Wt 119 lb 3.2 oz (54.1 kg)   SpO2 97%   BMI 25.79 kg/m²    Physical Exam  Constitutional:       General: He is not in acute distress. Appearance: He is well-developed. He is not diaphoretic. HENT:      Head: Normocephalic and atraumatic. Mouth/Throat:      Pharynx: No oropharyngeal exudate. Eyes:      Pupils: Pupils are equal, round, and reactive to light. Neck:      Musculoskeletal: Neck supple. Vascular: No JVD. Cardiovascular:      Heart sounds: Normal heart sounds. No murmur. No friction rub. No gallop. Pulmonary:      Effort: Pulmonary effort is normal.      Breath sounds: No wheezing or rales. Abdominal:      General: Bowel sounds are normal. There is no distension. Palpations: Abdomen is soft. Tenderness: There is no tenderness. Lymphadenopathy:      Cervical: No cervical adenopathy. Skin:     General: Skin is warm and dry. Findings: No rash.    Neurological:      Mental Status: He is alert. Cranial Nerves: Cranial nerve deficit present. Comments: nonverbal            Data Reviewed:   LABS:  CBC:  No results for input(s): WBC, HGB, HCT, MCV, PLT in the last 72 hours. BMP:  Recent Labs     12/31/19  0937 01/01/20  0503 01/02/20  0524    137 130*   K 3.4* 2.9* 3.3*   CL 86* 88* 87*   CO2 41* 40* 37*   PHOS 2.9 2.5 1.9*   BUN 20 16 12   CREATININE <0.5* <0.5* <0.5*     LIVER PROFILE:   Recent Labs     01/02/20  0524   *   ALT 52*   BILIDIR 2.5*   BILITOT 3.2*   ALKPHOS 173*     PT/INR:No results for input(s): PROTIME, INR in the last 72 hours. APTT: No results for input(s): APTT in the last 72 hours. UA:No results for input(s): NITRITE, COLORU, PHUR, LABCAST, WBCUA, RBCUA, MUCUS, TRICHOMONAS, YEAST, BACTERIA, CLARITYU, SPECGRAV, LEUKOCYTESUR, UROBILINOGEN, BILIRUBINUR, BLOODU, GLUCOSEU, AMORPHOUS in the last 72 hours. Invalid input(s): KETONESU  Recent Labs     12/30/19  1303 12/31/19  0025   PHART 7.394 7.442   PPO2CDW 88.9* 72.9*   PO2ART 166.3* 96.4       Vent Information  Skin Assessment: Clean, dry, & intact  FiO2 : 50 %    CXR personally reviewed, reduced lung volumes, left basilar atelectasis           Assessment:     1. Acute on chronic resp failure, hypoxic              -acute pulm edema vs mucous plugging              -restrictive lung disease/kyphoscoliosis  2. Elevated bilirubin, leukopenia/sepsis              -?obstructive jaundice/cholangitis  3. Chronic cognitive impairment, nonverbal              -cerebral palsy  4. Chronic dysphagia with G tube    Plan:      -He developed resp failure as a secondary issue resulting from abdominal pain and associated shallow respirations, pulm edema caused by IVF on admission for sepsis, and aspiration as family may have been feeding him prior to admission. BAL cultures negative and PCO2 better.  I feel he is optimized from a pulm standpoint for any GI intervention needed.   -Wean FIO2  -Empiric atb  -PRN bipap

## 2020-01-02 NOTE — PROGRESS NOTES
Hospitalist Progress Note      PCP: Shaunna Eden    Date of Admission: 12/24/2019    Chief Complaint:  Abdominal pain     Hospital Course:   44 y. o. male who presents to [facility]) with acute onset and progressive course of abdominal pain, elevated liver function tests,  Non verbal, discussed with staff    Medications:  Reviewed    Infusion Medications    dextrose 5% and 0.45% NaCl with KCl 20 mEq 50 mL/hr at 01/01/20 1731     Scheduled Medications    fentaNYL  1 patch Transdermal Q72H    levothyroxine  88 mcg Per G Tube Daily    PHENobarbital  30 mg Per G Tube TID    valproate  500 mg Per G Tube TID    sodium chloride flush  10 mL Intravenous 2 times per day    piperacillin-tazobactam  3.375 g Intravenous Q8H     PRN Meds: ipratropium, levalbuterol, perflutren lipid microspheres, morphine, sodium chloride flush, magnesium hydroxide, ondansetron, potassium chloride **OR** potassium alternative oral replacement **OR** potassium chloride, magnesium sulfate, acetaminophen      Intake/Output Summary (Last 24 hours) at 1/2/2020 1122  Last data filed at 1/2/2020 0622  Gross per 24 hour   Intake 910 ml   Output 0 ml   Net 910 ml       Physical Exam Performed:    /76   Pulse 103   Temp 97.4 °F (36.3 °C) (Oral)   Resp 18   Ht 4' 9\" (1.448 m)   Wt 119 lb 3.2 oz (54.1 kg)   SpO2 97%   BMI 25.79 kg/m²     General appearance: Quadriparesis, appears ill, on oxygen  Respiratory:  Decreased breath sounds bilaterally with few rhonchi  Cardiovascular: Regular rate and rhythm with normal S1/S2  Abdomen: firm , ?-tender, non-distended with normal bowel sounds. Musculoskeletal: No clubbing, cyanosis or edema bilaterally. Neurologic: Quadriparesis, nonverbal.  Labs:   No results for input(s): WBC, HGB, HCT, PLT in the last 72 hours.   Recent Labs     12/31/19  0937 01/01/20  0503 01/02/20  0524    137 130*   K 3.4* 2.9* 3.3*   CL 86* 88* 87*   CO2 41* 40* 37*   BUN 20 16 12   CREATININE <0.5* <0.5*

## 2020-01-03 ENCOUNTER — APPOINTMENT (OUTPATIENT)
Dept: GENERAL RADIOLOGY | Age: 50
DRG: 444 | End: 2020-01-03
Payer: MEDICARE

## 2020-01-03 VITALS
SYSTOLIC BLOOD PRESSURE: 92 MMHG | BODY MASS INDEX: 23.89 KG/M2 | HEART RATE: 88 BPM | OXYGEN SATURATION: 95 % | WEIGHT: 121.69 LBS | HEIGHT: 60 IN | DIASTOLIC BLOOD PRESSURE: 56 MMHG | RESPIRATION RATE: 16 BRPM | TEMPERATURE: 98.2 F

## 2020-01-03 LAB
A/G RATIO: 0.5 (ref 1.1–2.2)
ALBUMIN SERPL-MCNC: 1.8 G/DL (ref 3.4–5)
ALP BLD-CCNC: 209 U/L (ref 40–129)
ALT SERPL-CCNC: 43 U/L (ref 10–40)
ANION GAP SERPL CALCULATED.3IONS-SCNC: 6 MMOL/L (ref 3–16)
ANION GAP SERPL CALCULATED.3IONS-SCNC: 7 MMOL/L (ref 3–16)
AST SERPL-CCNC: 171 U/L (ref 15–37)
BILIRUB SERPL-MCNC: 3.2 MG/DL (ref 0–1)
BUN BLDV-MCNC: 13 MG/DL (ref 7–20)
BUN BLDV-MCNC: 13 MG/DL (ref 7–20)
CALCIUM SERPL-MCNC: 7.4 MG/DL (ref 8.3–10.6)
CALCIUM SERPL-MCNC: 7.4 MG/DL (ref 8.3–10.6)
CHLORIDE BLD-SCNC: 91 MMOL/L (ref 99–110)
CHLORIDE BLD-SCNC: 91 MMOL/L (ref 99–110)
CO2: 32 MMOL/L (ref 21–32)
CO2: 33 MMOL/L (ref 21–32)
CREAT SERPL-MCNC: <0.5 MG/DL (ref 0.9–1.3)
CREAT SERPL-MCNC: <0.5 MG/DL (ref 0.9–1.3)
GFR AFRICAN AMERICAN: >60
GFR AFRICAN AMERICAN: >60
GFR NON-AFRICAN AMERICAN: >60
GFR NON-AFRICAN AMERICAN: >60
GLOBULIN: 3.7 G/DL
GLUCOSE BLD-MCNC: 105 MG/DL (ref 70–99)
GLUCOSE BLD-MCNC: 108 MG/DL (ref 70–99)
GLUCOSE BLD-MCNC: 109 MG/DL (ref 70–99)
GLUCOSE BLD-MCNC: 112 MG/DL (ref 70–99)
GLUCOSE BLD-MCNC: 120 MG/DL (ref 70–99)
GLUCOSE BLD-MCNC: 121 MG/DL (ref 70–99)
MAGNESIUM: 2.1 MG/DL (ref 1.8–2.4)
PERFORMED ON: ABNORMAL
PHOSPHORUS: 1.3 MG/DL (ref 2.5–4.9)
POTASSIUM SERPL-SCNC: 3.9 MMOL/L (ref 3.5–5.1)
POTASSIUM SERPL-SCNC: 4 MMOL/L (ref 3.5–5.1)
SODIUM BLD-SCNC: 130 MMOL/L (ref 136–145)
SODIUM BLD-SCNC: 130 MMOL/L (ref 136–145)
TOTAL PROTEIN: 5.5 G/DL (ref 6.4–8.2)

## 2020-01-03 PROCEDURE — 84100 ASSAY OF PHOSPHORUS: CPT

## 2020-01-03 PROCEDURE — 80053 COMPREHEN METABOLIC PANEL: CPT

## 2020-01-03 PROCEDURE — 99233 SBSQ HOSP IP/OBS HIGH 50: CPT | Performed by: INTERNAL MEDICINE

## 2020-01-03 PROCEDURE — 36415 COLL VENOUS BLD VENIPUNCTURE: CPT

## 2020-01-03 PROCEDURE — 6360000002 HC RX W HCPCS: Performed by: HOSPITALIST

## 2020-01-03 PROCEDURE — 6370000000 HC RX 637 (ALT 250 FOR IP): Performed by: INTERNAL MEDICINE

## 2020-01-03 PROCEDURE — 83735 ASSAY OF MAGNESIUM: CPT

## 2020-01-03 PROCEDURE — 6360000002 HC RX W HCPCS: Performed by: INTERNAL MEDICINE

## 2020-01-03 PROCEDURE — 2580000003 HC RX 258: Performed by: INTERNAL MEDICINE

## 2020-01-03 RX ADMIN — PHENOBARBITAL 30 MG: 30 TABLET ORAL at 14:44

## 2020-01-03 RX ADMIN — PIPERACILLIN AND TAZOBACTAM 3.38 G: 3; .375 INJECTION, POWDER, FOR SOLUTION INTRAVENOUS at 05:07

## 2020-01-03 RX ADMIN — PIPERACILLIN AND TAZOBACTAM 3.38 G: 3; .375 INJECTION, POWDER, FOR SOLUTION INTRAVENOUS at 12:31

## 2020-01-03 RX ADMIN — MORPHINE SULFATE 1 MG: 2 INJECTION, SOLUTION INTRAMUSCULAR; INTRAVENOUS at 12:31

## 2020-01-03 RX ADMIN — Medication 10 ML: at 09:18

## 2020-01-03 RX ADMIN — MORPHINE SULFATE 1 MG: 2 INJECTION, SOLUTION INTRAMUSCULAR; INTRAVENOUS at 00:58

## 2020-01-03 RX ADMIN — LEVOTHYROXINE SODIUM 88 MCG: 0.09 TABLET ORAL at 06:03

## 2020-01-03 RX ADMIN — VALPROIC ACID 500 MG: 250 SOLUTION ORAL at 14:44

## 2020-01-03 RX ADMIN — VALPROIC ACID 500 MG: 250 SOLUTION ORAL at 09:17

## 2020-01-03 RX ADMIN — PHENOBARBITAL 30 MG: 30 TABLET ORAL at 09:17

## 2020-01-03 RX ADMIN — MORPHINE SULFATE 1 MG: 2 INJECTION, SOLUTION INTRAMUSCULAR; INTRAVENOUS at 06:03

## 2020-01-03 ASSESSMENT — PAIN SCALES - GENERAL
PAINLEVEL_OUTOF10: 10
PAINLEVEL_OUTOF10: 0
PAINLEVEL_OUTOF10: 10
PAINLEVEL_OUTOF10: 5

## 2020-01-03 NOTE — DISCHARGE INSTR - COC
Continuity of Care Form    Patient Name: hCris Hook   :  1970  MRN:  [id number]    Admit date:  2019  Discharge date:  1/3/19    Code Status Order: DNR-CCA   Advance Directives:   [address] Directive Type of Healthcare Directive Copy in [address] Agent's Name Healthcare Agent's Phone Number    19  Other (Comment)  --  --  --  --  --    19  --  --  --  --  --  --    19  --  --  --  --  --  --          Admitting Physician:  Darvin Baez MD  PCP: Alyssa Vinson    Discharging Nurse: ANAND CARBAJAL [facility] Unit/Room#: 0008/5430-50  Discharging Unit Phone Number: [phone]    Emergency Contact:   Extended Emergency Contact Information  Primary Emergency Contact: Kecia Malinda  Address: [place] [address]  Home Phone: [phone]  Mobile Phone: [phone]  Relation: Parent  Secondary Emergency Contact: augustina padron  Home Phone: [phone]  Relation: Brother/Sister    Past Surgical History:  Past Surgical History:   Procedure Laterality Date    BRONCHOSCOPY N/A 2019    BRONCHOSCOPY ALVEOLAR LAVAGE performed by Bailey Brewer MD at [address] [place] [address]  2019    BRONCHOSCOPY THERAPUTIC ASPIRATION INITIAL performed by Bailey Brewer MD at [address] [place] [address]       Immunization History: There is no immunization history on file for this patient. Active Problems:  Patient Active Problem List   Diagnosis Code    Acute cholecystitis K81.0       Isolation/Infection:   Isolation          No Isolation        Patient Infection Status     None to display          Nurse Assessment:  Last Vital Signs: BP 96/62   Pulse 127   Temp 97.9 °F (36.6 °C) (Oral)   Resp 20   Ht 4' 9\" (1.448 m)   Wt 121 lb 11.1 oz (55.2 kg)   SpO2 93%   BMI 26.33 kg/m²     Last documented pain score (0-10 scale): Pain Level: 10  Last Weight:    Wt

## 2020-01-03 NOTE — PROGRESS NOTES
Pulmonary & Critical Care Inpatient Progress Note   Marc Guzman MD     REASON FOR TODAY'S VISIT:  Acute resp failure    SUBJECTIVE:   Decreased oxygen requirements   Hypotensive overnight   Net positive 2L    Scheduled Meds:   fentaNYL  1 patch Transdermal Q72H    levothyroxine  88 mcg Per G Tube Daily    PHENobarbital  30 mg Per G Tube TID    valproate  500 mg Per G Tube TID    sodium chloride flush  10 mL Intravenous 2 times per day    piperacillin-tazobactam  3.375 g Intravenous Q8H       Continuous Infusions:      PRN Meds:  LORazepam, morphine, ipratropium, levalbuterol, perflutren lipid microspheres, sodium chloride flush, magnesium hydroxide, ondansetron, potassium chloride **OR** potassium alternative oral replacement **OR** potassium chloride, magnesium sulfate, acetaminophen    ALLERGIES:  Patient has No Known Allergies. Objective:   PHYSICAL EXAM:  /67   Pulse 112   Temp 97.8 °F (36.6 °C) (Oral)   Resp 18   Ht 4' 9\" (1.448 m)   Wt 121 lb 11.1 oz (55.2 kg)   SpO2 96%   BMI 26.33 kg/m²    Physical Exam  Constitutional:       General: He is not in acute distress. Appearance: He is well-developed. He is not diaphoretic. HENT:      Head: Normocephalic and atraumatic. Mouth/Throat:      Pharynx: No oropharyngeal exudate. Eyes:      Pupils: Pupils are equal, round, and reactive to light. Neck:      Musculoskeletal: Neck supple. Vascular: No JVD. Cardiovascular:      Heart sounds: Normal heart sounds. No murmur. No friction rub. No gallop. Pulmonary:      Effort: Pulmonary effort is normal.      Breath sounds: No wheezing or rales. Abdominal:      General: Bowel sounds are normal. There is no distension. Palpations: Abdomen is soft. Tenderness: There is no tenderness. Lymphadenopathy:      Cervical: No cervical adenopathy. Skin:     General: Skin is warm and dry. Findings: No rash. Neurological:      Mental Status: He is alert.       Cranial Nerves: Cranial nerve deficit present. Comments: nonverbal          Data Reviewed:   LABS:  CBC:  No results for input(s): WBC, HGB, HCT, MCV, PLT in the last 72 hours. BMP:  Recent Labs     01/01/20  0503 01/02/20  0524 01/03/20  0454    130* 130*  130*   K 2.9* 3.3* 3.9  4.0   CL 88* 87* 91*  91*   CO2 40* 37* 32  33*   PHOS 2.5 1.9* 1.3*   BUN 16 12 13  13   CREATININE <0.5* <0.5* <0.5*  <0.5*     LIVER PROFILE:   Recent Labs     01/02/20  0524 01/03/20  0454   * 171*   ALT 52* 43*   BILIDIR 2.5*  --    BILITOT 3.2* 3.2*   ALKPHOS 173* 209*     PT/INR:No results for input(s): PROTIME, INR in the last 72 hours. APTT: No results for input(s): APTT in the last 72 hours. UA:No results for input(s): NITRITE, COLORU, PHUR, LABCAST, WBCUA, RBCUA, MUCUS, TRICHOMONAS, YEAST, BACTERIA, CLARITYU, SPECGRAV, LEUKOCYTESUR, UROBILINOGEN, BILIRUBINUR, BLOODU, GLUCOSEU, AMORPHOUS in the last 72 hours. Invalid input(s): KETONESU  No results for input(s): PHART, RQJ2CXV, PO2ART in the last 72 hours. Vent Information  Skin Assessment: Clean, dry, & intact  FiO2 : 50 %    CXR personally reviewed, reduced lung volumes, left basilar atelectasis           Assessment:     1. Acute on chronic resp failure, hypoxic              -acute pulm edema vs mucous plugging              -restrictive lung disease/kyphoscoliosis  2. Elevated bilirubin, leukopenia/sepsis              -?obstructive jaundice/cholangitis  3. Chronic cognitive impairment, nonverbal              -cerebral palsy  4. Chronic dysphagia with G tube    Plan:      -Stable from a pulm standpoint for any GI intervention needed  -Wean FIO2  -Empiric atb per surgery/GI, his BAL cultures are negative so no further atb needed from a pulm standpoint  -PRN bipap   -Tube feeds, optimize nutritional status, he has a very low albumin.  Would give albumin rather than crystalloid if hypotension persists   -Agree with palliative care input, overall prognosis and

## 2020-01-03 NOTE — PLAN OF CARE
Problem: Falls - Risk of:  Goal: Will remain free from falls  Description  Will remain free from falls  Outcome: Ongoing  Goal: Absence of physical injury  Description  Absence of physical injury  Outcome: Ongoing     Patient zaid light is within reach. Bed in lowest position. Non-skid footwear on feet.

## 2020-01-03 NOTE — DISCHARGE SUMMARY
Hospital Medicine Discharge Summary    Patient ID: Earl Fitch      Patient's PCP: Elisa Shaw    Admit Date: 12/24/2019     Discharge Date:  01/03/20     Admitting Physician: Mariel Marrero MD     Discharge Physician: Wale Montesinos MD     Discharge Diagnoses: Active Hospital Problems    Diagnosis    Acute cholecystitis [K81.0]     Quadriplegia  Cerebral palsy  Obstructive jaundice  Chronic dysphasia, G tube in situ  Acute on chronic respiratory failure with hypoxemia      The patient was seen and examined on day of discharge and this discharge summary is in conjunction with any daily progress note from day of discharge. Hospital Course:     Patient was admitted with acute on chronic respiratory failure and obstructive jaundice in addition to acute cholecystitis. Patient requires ERCP, but multiple medical issues and acute conditions did not allow the patient to have the procedure. Currently stable under antibiotic and oxygen treatments. Was cleared by pulmonology for possible ERCP, if indicated. Because of multiple chronic issues requiring extensive and aggressive medical management, LTAC was discussed. Patient's family members agreed. Patient will be transferred to [facility], where he can be stabilized and seen by gastroenterology for ERCP down the road. Patient's family members agree and patient will be discharged to [facility] today. Physical Exam Performed:     BP 96/62   Pulse 127   Temp 97.9 °F (36.6 °C) (Oral)   Resp 20   Ht 4' 9\" (1.448 m)   Wt 121 lb 11.1 oz (55.2 kg)   SpO2 93%   BMI 26.33 kg/m²       General appearance:  No apparent distress, appears stated age and cooperative. HEENT:  Normal cephalic, atraumatic without obvious deformity. Pupils equal, round, and reactive to light. Extra ocular muscles intact. Conjunctivae/corneas clear. Neck: Supple, with full range of motion. No jugular venous distention. Trachea midline. Respiratory:  Normal respiratory effort. Mon-Wed-Fri      levothyroxine (SYNTHROID) 88 MCG tablet 88 mcg by Per G Tube route Daily             Time Spent on discharge is more than 45 minutes in the examination, evaluation, counseling and review of medications and discharge plan. Signed:    Kary Ravi MD   1/3/2020      Thank you Chintan Richard for the opportunity to be involved in this patient's care. If you have any questions or concerns please feel free to contact me at [phone].

## 2020-02-03 LAB
FUNGUS (MYCOLOGY) CULTURE: NORMAL
FUNGUS STAIN: NORMAL

## 2020-02-18 LAB
AFB CULTURE (MYCOBACTERIA): NORMAL
AFB SMEAR: NORMAL

## 2022-08-10 NOTE — PROGRESS NOTES
Render Risk Assessment In Note?: no Detail Level: Detailed liver enzymes  3. Could justify ERCP based on duct dilation and enzymes but need pulmonary clearance for intubation. Currently respiratory status precludes further intervention. Will follow.       Attila Euceda DO  2:57 PM 1/1/2020            [address] [place] [address]    [address]      [address] [place] [address]     Phone: [phone]     Fax: [phone] Note Text (......Xxx Chief Complaint.): This diagnosis correlates with the Other (Free Text): Kain as a courtesy

## 2023-04-17 ENCOUNTER — HOSPITAL ENCOUNTER (EMERGENCY)
Age: 53
Discharge: HOME | End: 2023-04-17
Attending: EMERGENCY MEDICINE
Payer: MEDICARE

## 2023-04-17 VITALS
TEMPERATURE: 98.2 F | WEIGHT: 140 LBS | SYSTOLIC BLOOD PRESSURE: 150 MMHG | HEART RATE: 98 BPM | DIASTOLIC BLOOD PRESSURE: 84 MMHG | OXYGEN SATURATION: 99 % | RESPIRATION RATE: 18 BRPM | BODY MASS INDEX: 30.3 KG/M2

## 2023-04-17 DIAGNOSIS — R23.3 PETECHIAE: ICD-10-CM

## 2023-04-17 DIAGNOSIS — R21 RASH AND OTHER NONSPECIFIC SKIN ERUPTION: Primary | ICD-10-CM

## 2023-04-17 LAB
ALBUMIN SERPL-MCNC: 4.8 G/DL (ref 3.4–5)
ALBUMIN/GLOB SERPL: 1.3 {RATIO} (ref 1.1–2.2)
ALP SERPL-CCNC: 61 U/L (ref 40–129)
ALT SERPL-CCNC: 14 U/L (ref 10–40)
ANION GAP SERPL CALCULATED.3IONS-SCNC: 6 MMOL/L (ref 3–16)
AST SERPL-CCNC: 22 U/L (ref 15–37)
BASOPHILS # BLD: 0 K/UL (ref 0–0.2)
BASOPHILS NFR BLD: 0.4 %
BILIRUB SERPL-MCNC: 0.5 MG/DL (ref 0–1)
BUN SERPL-MCNC: 7 MG/DL (ref 7–20)
CALCIUM SERPL-MCNC: 9.9 MG/DL (ref 8.3–10.6)
CHLORIDE SERPL-SCNC: 93 MMOL/L (ref 99–110)
CK SERPL-CCNC: 88 U/L (ref 39–308)
CO2 SERPL-SCNC: 32 MMOL/L (ref 21–32)
CREAT SERPL-MCNC: <0.5 MG/DL (ref 0.9–1.3)
DEPRECATED RDW RBC AUTO: 12.6 % (ref 12.4–15.4)
EOSINOPHIL # BLD: 0 K/UL (ref 0–0.6)
EOSINOPHIL NFR BLD: 0.5 %
GFR SERPLBLD CREATININE-BSD FMLA CKD-EPI: >60 ML/MIN/{1.73_M2}
GLUCOSE SERPL-MCNC: 104 MG/DL (ref 70–99)
HCT VFR BLD AUTO: 47.9 % (ref 40.5–52.5)
HGB BLD-MCNC: 16.1 G/DL (ref 13.5–17.5)
LYMPHOCYTES # BLD: 1.6 K/UL (ref 1–5.1)
LYMPHOCYTES NFR BLD: 28.4 %
MCH RBC QN AUTO: 32.5 PG (ref 26–34)
MCHC RBC AUTO-ENTMCNC: 33.6 G/DL (ref 31–36)
MCV RBC AUTO: 96.8 FL (ref 80–100)
MONOCYTES # BLD: 0.5 K/UL (ref 0–1.3)
MONOCYTES NFR BLD: 9 %
NEUTROPHILS # BLD: 3.5 K/UL (ref 1.7–7.7)
NEUTROPHILS NFR BLD: 61.7 %
PLATELET # BLD AUTO: 229 K/UL (ref 135–450)
PMV BLD AUTO: 7.9 FL (ref 5–10.5)
POTASSIUM SERPL-SCNC: 4.6 MMOL/L (ref 3.5–5.1)
PROT SERPL-MCNC: 8.4 G/DL (ref 6.4–8.2)
RBC # BLD AUTO: 4.94 M/UL (ref 4.2–5.9)
SODIUM SERPL-SCNC: 131 MMOL/L (ref 136–145)
WBC # BLD AUTO: 5.6 K/UL (ref 4–11)

## 2023-04-17 PROCEDURE — 80053 COMPREHEN METABOLIC PANEL: CPT

## 2023-04-17 PROCEDURE — 85025 COMPLETE CBC W/AUTO DIFF WBC: CPT

## 2023-04-17 PROCEDURE — 82550 ASSAY OF CK (CPK): CPT

## 2023-04-17 PROCEDURE — 99283 EMERGENCY DEPT VISIT LOW MDM: CPT

## 2023-04-17 NOTE — DISCHARGE INSTRUCTIONS
The blood work looks normal.  Platelet count is normal and there is no signs of rhabdomyolysis. This could have been caused by pressure onto the skin causing the rash to appear. Please follow-up with his primary care doctor. Return for worsening symptoms.

## 2023-04-17 NOTE — ED PROVIDER NOTES
(36.8 °C)   TempSrc: Tympanic   Weight: 140 lb (63.5 kg)       Patient was treated with and given the following medications:  Medications - No data to display          Is this patient to be included in the SEP-1 Core Measure due to severe sepsis or septic shock? No   Exclusion criteria - the patient is NOT to be included for SEP-1 Core Measure due to: Infection is not suspected    CC/HPI Summary, DDx, ED Course, and Reassessment:     49-year-old male presenting for evaluation of petechial rash on the right forearm. We will obtain basic laboratory evaluation ordered to evaluate for thrombocytopenia and rhabdomyolysis. It is possible that patient may have had some soft tissue contusion or inadvertent bruising and petechia formation after being removed at his nursing facility. The differential diagnosis associated with the patient's presentation includes, but is not limited to: Thrombocytopenia, traumatic injury, musculoskeletal injury, electrolyte abnormality    CONSULTS: (Who and What was discussed)  None    Discussion with Other Professionals : None      Social Determinants : None    Patient's care impacted by chronic condition(s): Cerebral palsy, seizures    Records Reviewed : None    Clinical information obtained from an independent historian. Disposition Considerations (include 1 Tests not done, Shared Decision Making, Pt Expectation of Test or Tx.):     Patient's platelet count is normal.  CKs normal.  As laboratory evaluation is unremarkable and no signs of rhabdomyolysis, compartment syndrome, patient will be discharged back to his nursing facility. Family did note that he had a seizure while in the emergency department however is returning to his baseline and states that this is not unusual for him. The patient will be discharged from the emergency department. The patient was counseled on their diagnosis and any medications prescribed. They were advised on the need for PCP followup.  They were

## 2023-08-31 ENCOUNTER — APPOINTMENT (OUTPATIENT)
Dept: GENERAL RADIOLOGY | Age: 53
DRG: 177 | End: 2023-08-31
Payer: MEDICARE

## 2023-08-31 ENCOUNTER — HOSPITAL ENCOUNTER (INPATIENT)
Age: 53
LOS: 2 days | Discharge: NURSING, REHAB OR LONG-TERM CARE | DRG: 177 | End: 2023-09-03
Attending: EMERGENCY MEDICINE | Admitting: PEDIATRICS
Payer: MEDICARE

## 2023-08-31 DIAGNOSIS — U07.1 COVID: Primary | ICD-10-CM

## 2023-08-31 LAB
ALBUMIN SERPL-MCNC: 3.8 G/DL (ref 3.4–5)
ALBUMIN/GLOB SERPL: 1.1 {RATIO} (ref 1.1–2.2)
ALP SERPL-CCNC: 50 U/L (ref 40–129)
ALT SERPL-CCNC: 15 U/L (ref 10–40)
ANION GAP SERPL CALCULATED.3IONS-SCNC: 10 MMOL/L (ref 3–16)
ANISOCYTOSIS BLD QL SMEAR: ABNORMAL
AST SERPL-CCNC: 22 U/L (ref 15–37)
BASOPHILS # BLD: 0 K/UL (ref 0–0.2)
BASOPHILS NFR BLD: 0 %
BILIRUB SERPL-MCNC: 0.4 MG/DL (ref 0–1)
BUN SERPL-MCNC: 10 MG/DL (ref 7–20)
CALCIUM SERPL-MCNC: 9 MG/DL (ref 8.3–10.6)
CHLORIDE SERPL-SCNC: 91 MMOL/L (ref 99–110)
CO2 SERPL-SCNC: 29 MMOL/L (ref 21–32)
CREAT SERPL-MCNC: <0.5 MG/DL (ref 0.9–1.3)
DEPRECATED RDW RBC AUTO: 13.2 % (ref 12.4–15.4)
EOSINOPHIL # BLD: 0 K/UL (ref 0–0.6)
EOSINOPHIL NFR BLD: 0 %
FLUAV RNA RESP QL NAA+PROBE: NOT DETECTED
FLUBV RNA RESP QL NAA+PROBE: NOT DETECTED
GFR SERPLBLD CREATININE-BSD FMLA CKD-EPI: >60 ML/MIN/{1.73_M2}
GLUCOSE SERPL-MCNC: 110 MG/DL (ref 70–99)
HCT VFR BLD AUTO: 40.5 % (ref 40.5–52.5)
HGB BLD-MCNC: 13.9 G/DL (ref 13.5–17.5)
LIPASE SERPL-CCNC: 18 U/L (ref 13–60)
LYMPHOCYTES # BLD: 0.9 K/UL (ref 1–5.1)
LYMPHOCYTES NFR BLD: 18 %
MCH RBC QN AUTO: 32.8 PG (ref 26–34)
MCHC RBC AUTO-ENTMCNC: 34.3 G/DL (ref 31–36)
MCV RBC AUTO: 95.5 FL (ref 80–100)
MONOCYTES # BLD: 0.7 K/UL (ref 0–1.3)
MONOCYTES NFR BLD: 16 %
NEUTROPHILS # BLD: 2.7 K/UL (ref 1.7–7.7)
NEUTROPHILS NFR BLD: 36 %
NEUTS BAND NFR BLD MANUAL: 27 % (ref 0–7)
PLATELET # BLD AUTO: 140 K/UL (ref 135–450)
PLATELET BLD QL SMEAR: ADEQUATE
PMV BLD AUTO: 8.4 FL (ref 5–10.5)
POIKILOCYTOSIS BLD QL SMEAR: ABNORMAL
POTASSIUM SERPL-SCNC: 3.9 MMOL/L (ref 3.5–5.1)
PROT SERPL-MCNC: 7.3 G/DL (ref 6.4–8.2)
RBC # BLD AUTO: 4.24 M/UL (ref 4.2–5.9)
SARS-COV-2 RNA RESP QL NAA+PROBE: DETECTED
SLIDE REVIEW: ABNORMAL
SODIUM SERPL-SCNC: 130 MMOL/L (ref 136–145)
TROPONIN, HIGH SENSITIVITY: 13 NG/L (ref 0–22)
TROPONIN, HIGH SENSITIVITY: 15 NG/L (ref 0–22)
VARIANT LYMPHS NFR BLD MANUAL: 3 % (ref 0–6)
WBC # BLD AUTO: 4.3 K/UL (ref 4–11)

## 2023-08-31 PROCEDURE — 84484 ASSAY OF TROPONIN QUANT: CPT

## 2023-08-31 PROCEDURE — 87040 BLOOD CULTURE FOR BACTERIA: CPT

## 2023-08-31 PROCEDURE — 85025 COMPLETE CBC W/AUTO DIFF WBC: CPT

## 2023-08-31 PROCEDURE — 93005 ELECTROCARDIOGRAM TRACING: CPT | Performed by: EMERGENCY MEDICINE

## 2023-08-31 PROCEDURE — 83690 ASSAY OF LIPASE: CPT

## 2023-08-31 PROCEDURE — 80053 COMPREHEN METABOLIC PANEL: CPT

## 2023-08-31 PROCEDURE — 71045 X-RAY EXAM CHEST 1 VIEW: CPT

## 2023-08-31 PROCEDURE — 6360000002 HC RX W HCPCS: Performed by: EMERGENCY MEDICINE

## 2023-08-31 PROCEDURE — 96361 HYDRATE IV INFUSION ADD-ON: CPT

## 2023-08-31 PROCEDURE — 87636 SARSCOV2 & INF A&B AMP PRB: CPT

## 2023-08-31 PROCEDURE — 99285 EMERGENCY DEPT VISIT HI MDM: CPT

## 2023-08-31 PROCEDURE — 2580000003 HC RX 258: Performed by: EMERGENCY MEDICINE

## 2023-08-31 PROCEDURE — 96374 THER/PROPH/DIAG INJ IV PUSH: CPT

## 2023-08-31 RX ORDER — 0.9 % SODIUM CHLORIDE 0.9 %
1000 INTRAVENOUS SOLUTION INTRAVENOUS ONCE
Status: COMPLETED | OUTPATIENT
Start: 2023-08-31 | End: 2023-09-01

## 2023-08-31 RX ORDER — KETOROLAC TROMETHAMINE 30 MG/ML
15 INJECTION, SOLUTION INTRAMUSCULAR; INTRAVENOUS ONCE
Status: COMPLETED | OUTPATIENT
Start: 2023-08-31 | End: 2023-08-31

## 2023-08-31 RX ADMIN — SODIUM CHLORIDE 1000 ML: 9 INJECTION, SOLUTION INTRAVENOUS at 21:17

## 2023-08-31 RX ADMIN — KETOROLAC TROMETHAMINE 15 MG: 30 INJECTION, SOLUTION INTRAMUSCULAR at 21:16

## 2023-08-31 ASSESSMENT — PAIN - FUNCTIONAL ASSESSMENT: PAIN_FUNCTIONAL_ASSESSMENT: WONG-BAKER FACES

## 2023-08-31 ASSESSMENT — PAIN SCALES - WONG BAKER
WONGBAKER_NUMERICALRESPONSE: HURTS A LITTLE BIT
WONGBAKER_NUMERICALRESPONSE: 2

## 2023-09-01 PROBLEM — J96.01 ACUTE RESPIRATORY FAILURE WITH HYPOXIA (HCC): Status: ACTIVE | Noted: 2023-09-01

## 2023-09-01 LAB
BACTERIA URNS QL MICRO: ABNORMAL /HPF
BILIRUB UR QL STRIP.AUTO: NEGATIVE
CLARITY UR: CLEAR
COLOR UR: YELLOW
EKG ATRIAL RATE: 120 BPM
EKG DIAGNOSIS: NORMAL
EKG P AXIS: 61 DEGREES
EKG P-R INTERVAL: 132 MS
EKG Q-T INTERVAL: 312 MS
EKG QRS DURATION: 80 MS
EKG QTC CALCULATION (BAZETT): 440 MS
EKG R AXIS: 112 DEGREES
EKG T AXIS: 32 DEGREES
EKG VENTRICULAR RATE: 120 BPM
EPI CELLS #/AREA URNS HPF: ABNORMAL /HPF (ref 0–5)
GLUCOSE UR STRIP.AUTO-MCNC: NEGATIVE MG/DL
HGB UR QL STRIP.AUTO: NEGATIVE
KETONES UR STRIP.AUTO-MCNC: 15 MG/DL
LEUKOCYTE ESTERASE UR QL STRIP.AUTO: NEGATIVE
MUCOUS THREADS #/AREA URNS LPF: ABNORMAL /LPF
NITRITE UR QL STRIP.AUTO: NEGATIVE
PH UR STRIP.AUTO: 6 [PH] (ref 5–8)
PROT UR STRIP.AUTO-MCNC: ABNORMAL MG/DL
RBC #/AREA URNS HPF: ABNORMAL /HPF (ref 0–4)
SP GR UR STRIP.AUTO: 1.02 (ref 1–1.03)
UA COMPLETE W REFLEX CULTURE PNL UR: ABNORMAL
UA DIPSTICK W REFLEX MICRO PNL UR: YES
URN SPEC COLLECT METH UR: ABNORMAL
UROBILINOGEN UR STRIP-ACNC: 4 E.U./DL
WBC #/AREA URNS HPF: ABNORMAL /HPF (ref 0–5)

## 2023-09-01 PROCEDURE — 6360000002 HC RX W HCPCS: Performed by: PEDIATRICS

## 2023-09-01 PROCEDURE — 96361 HYDRATE IV INFUSION ADD-ON: CPT

## 2023-09-01 PROCEDURE — 2580000003 HC RX 258: Performed by: PEDIATRICS

## 2023-09-01 PROCEDURE — C9113 INJ PANTOPRAZOLE SODIUM, VIA: HCPCS | Performed by: PEDIATRICS

## 2023-09-01 PROCEDURE — 1200000000 HC SEMI PRIVATE

## 2023-09-01 PROCEDURE — 81001 URINALYSIS AUTO W/SCOPE: CPT

## 2023-09-01 PROCEDURE — 6370000000 HC RX 637 (ALT 250 FOR IP): Performed by: EMERGENCY MEDICINE

## 2023-09-01 PROCEDURE — 93010 ELECTROCARDIOGRAM REPORT: CPT | Performed by: INTERNAL MEDICINE

## 2023-09-01 PROCEDURE — 6370000000 HC RX 637 (ALT 250 FOR IP): Performed by: PEDIATRICS

## 2023-09-01 RX ORDER — PHENOBARBITAL 30 MG/1
30 TABLET ORAL 3 TIMES DAILY
Status: DISCONTINUED | OUTPATIENT
Start: 2023-09-01 | End: 2023-09-03 | Stop reason: HOSPADM

## 2023-09-01 RX ORDER — ONDANSETRON 2 MG/ML
4 INJECTION INTRAMUSCULAR; INTRAVENOUS EVERY 6 HOURS PRN
Status: DISCONTINUED | OUTPATIENT
Start: 2023-09-01 | End: 2023-09-03 | Stop reason: HOSPADM

## 2023-09-01 RX ORDER — VALPROIC ACID 250 MG/5ML
500 SOLUTION ORAL 3 TIMES DAILY
Status: DISCONTINUED | OUTPATIENT
Start: 2023-09-01 | End: 2023-09-03 | Stop reason: HOSPADM

## 2023-09-01 RX ORDER — LEVOTHYROXINE SODIUM 88 UG/1
88 TABLET ORAL DAILY
Status: DISCONTINUED | OUTPATIENT
Start: 2023-09-01 | End: 2023-09-03 | Stop reason: HOSPADM

## 2023-09-01 RX ORDER — LORAZEPAM 0.5 MG/1
0.5 TABLET ORAL PRN
Status: DISCONTINUED | OUTPATIENT
Start: 2023-09-01 | End: 2023-09-03 | Stop reason: HOSPADM

## 2023-09-01 RX ORDER — SODIUM CHLORIDE 0.9 % (FLUSH) 0.9 %
5-40 SYRINGE (ML) INJECTION EVERY 12 HOURS SCHEDULED
Status: DISCONTINUED | OUTPATIENT
Start: 2023-09-01 | End: 2023-09-03 | Stop reason: HOSPADM

## 2023-09-01 RX ORDER — ACETAMINOPHEN 325 MG/1
650 TABLET ORAL EVERY 6 HOURS PRN
Status: DISCONTINUED | OUTPATIENT
Start: 2023-09-01 | End: 2023-09-03 | Stop reason: HOSPADM

## 2023-09-01 RX ORDER — ONDANSETRON 4 MG/1
4 TABLET, ORALLY DISINTEGRATING ORAL EVERY 8 HOURS PRN
Status: DISCONTINUED | OUTPATIENT
Start: 2023-09-01 | End: 2023-09-03 | Stop reason: HOSPADM

## 2023-09-01 RX ORDER — POLYETHYLENE GLYCOL 3350 17 G/17G
17 POWDER, FOR SOLUTION ORAL DAILY PRN
Status: DISCONTINUED | OUTPATIENT
Start: 2023-09-01 | End: 2023-09-03 | Stop reason: HOSPADM

## 2023-09-01 RX ORDER — ENOXAPARIN SODIUM 100 MG/ML
40 INJECTION SUBCUTANEOUS DAILY
Status: DISCONTINUED | OUTPATIENT
Start: 2023-09-01 | End: 2023-09-03 | Stop reason: HOSPADM

## 2023-09-01 RX ORDER — ACETAMINOPHEN 650 MG/1
650 SUPPOSITORY RECTAL EVERY 4 HOURS PRN
Status: DISCONTINUED | OUTPATIENT
Start: 2023-09-01 | End: 2023-09-03 | Stop reason: HOSPADM

## 2023-09-01 RX ORDER — SODIUM CHLORIDE 0.9 % (FLUSH) 0.9 %
5-40 SYRINGE (ML) INJECTION PRN
Status: DISCONTINUED | OUTPATIENT
Start: 2023-09-01 | End: 2023-09-03 | Stop reason: HOSPADM

## 2023-09-01 RX ORDER — SODIUM CHLORIDE 9 MG/ML
INJECTION, SOLUTION INTRAVENOUS PRN
Status: DISCONTINUED | OUTPATIENT
Start: 2023-09-01 | End: 2023-09-03 | Stop reason: HOSPADM

## 2023-09-01 RX ORDER — MAGNESIUM HYDROXIDE/ALUMINUM HYDROXICE/SIMETHICONE 120; 1200; 1200 MG/30ML; MG/30ML; MG/30ML
15 SUSPENSION ORAL DAILY
Status: DISCONTINUED | OUTPATIENT
Start: 2023-09-01 | End: 2023-09-03 | Stop reason: HOSPADM

## 2023-09-01 RX ORDER — DEXAMETHASONE SODIUM PHOSPHATE 4 MG/ML
6 INJECTION, SOLUTION INTRA-ARTICULAR; INTRALESIONAL; INTRAMUSCULAR; INTRAVENOUS; SOFT TISSUE DAILY
Status: DISCONTINUED | OUTPATIENT
Start: 2023-09-01 | End: 2023-09-03 | Stop reason: HOSPADM

## 2023-09-01 RX ORDER — POLYETHYLENE GLYCOL 3350 17 G/17G
8.5 POWDER, FOR SOLUTION ORAL DAILY
Status: DISCONTINUED | OUTPATIENT
Start: 2023-09-01 | End: 2023-09-03 | Stop reason: HOSPADM

## 2023-09-01 RX ORDER — ACETAMINOPHEN 650 MG/1
650 SUPPOSITORY RECTAL EVERY 6 HOURS PRN
Status: DISCONTINUED | OUTPATIENT
Start: 2023-09-01 | End: 2023-09-03 | Stop reason: HOSPADM

## 2023-09-01 RX ORDER — PANTOPRAZOLE SODIUM 40 MG/10ML
40 INJECTION, POWDER, LYOPHILIZED, FOR SOLUTION INTRAVENOUS DAILY
Status: DISCONTINUED | OUTPATIENT
Start: 2023-09-01 | End: 2023-09-03 | Stop reason: HOSPADM

## 2023-09-01 RX ORDER — LORAZEPAM 2 MG/ML
2 INJECTION INTRAMUSCULAR PRN
Status: DISCONTINUED | OUTPATIENT
Start: 2023-09-01 | End: 2023-09-03 | Stop reason: HOSPADM

## 2023-09-01 RX ORDER — VALPROIC ACID 250 MG/5ML
500 SOLUTION ORAL ONCE
Status: COMPLETED | OUTPATIENT
Start: 2023-09-01 | End: 2023-09-01

## 2023-09-01 RX ADMIN — LEVOTHYROXINE SODIUM 88 MCG: 88 TABLET ORAL at 06:25

## 2023-09-01 RX ADMIN — DEXAMETHASONE SODIUM PHOSPHATE 6 MG: 4 INJECTION, SOLUTION INTRAMUSCULAR; INTRAVENOUS at 05:24

## 2023-09-01 RX ADMIN — Medication 10 ML: at 21:26

## 2023-09-01 RX ADMIN — PHENOBARBITAL 30 MG: 30 TABLET ORAL at 14:04

## 2023-09-01 RX ADMIN — VALPROIC ACID 500 MG: 250 SOLUTION ORAL at 02:19

## 2023-09-01 RX ADMIN — POLYETHYLENE GLYCOL 3350 8.5 G: 17 POWDER, FOR SOLUTION ORAL at 09:41

## 2023-09-01 RX ADMIN — PHENOBARBITAL 30 MG: 30 TABLET ORAL at 21:26

## 2023-09-01 RX ADMIN — Medication 10 ML: at 09:38

## 2023-09-01 RX ADMIN — ENOXAPARIN SODIUM 40 MG: 100 INJECTION SUBCUTANEOUS at 09:50

## 2023-09-01 RX ADMIN — PANTOPRAZOLE SODIUM 40 MG: 40 INJECTION, POWDER, FOR SOLUTION INTRAVENOUS at 09:38

## 2023-09-01 RX ADMIN — PHENOBARBITAL 30 MG: 30 TABLET ORAL at 09:41

## 2023-09-01 RX ADMIN — VALPROIC ACID 500 MG: 250 SOLUTION ORAL at 14:04

## 2023-09-01 RX ADMIN — VALPROIC ACID 500 MG: 250 SOLUTION ORAL at 21:26

## 2023-09-01 RX ADMIN — VALPROIC ACID 500 MG: 250 SOLUTION ORAL at 09:41

## 2023-09-01 RX ADMIN — ALUMINUM HYDROXIDE, MAGNESIUM HYDROXIDE, AND SIMETHICONE 15 ML: 200; 200; 20 SUSPENSION ORAL at 09:40

## 2023-09-01 NOTE — ED NOTES
Pt oxygen sat dropped to the 70s, unable to clear secretions, suctioned orally and oxygen sat returned to 92%.      Elida Stanford RN  09/01/23 4703

## 2023-09-01 NOTE — PROGRESS NOTES
4 Eyes Skin Assessment     NAME:  Amada Raza  YOB: 1970  MEDICAL RECORD NUMBER:  [id number]    The patient is being assessed for  Admission    I agree that at least one RN has performed a thorough Head to Toe Skin Assessment on the patient. ALL assessment sites listed below have been assessed. Areas assessed by both nurses:    Head, Face, Ears, Shoulders, Back, Chest, Arms, Elbows, Hands, Sacrum. Buttock, Coccyx, Ischium, Legs. Feet and Heels, and Under Medical Devices         Does the Patient have a Wound? Yes wound(s) were present on assessment.  LDA wound assessment was Initiated and completed by RN stage two to buttocks, healing wound on outside of left heel       Kael Prevention initiated by RN: Yes  Wound Care Orders initiated by RN: No    Pressure Injury (Stage 3,4, Unstageable, DTI, NWPT, and Complex wounds) if present, place Wound referral order by RN under : No    New Ostomies, if present place, Ostomy referral order under : No     Nurse 1 eSignature: Electronically signed by Samantha Rosenbaum RN on 9/1/23 at 5:11 AM EDT    **SHARE this note so that the co-signing nurse can place an eSignature**    Nurse 2 eSignature: Electronically signed by Jennifer Huff RN on 9/1/23 at 8:13 AM EDT

## 2023-09-01 NOTE — PROGRESS NOTES
Patient admitted to unit. Non-verbal. VSS. Satting at 96% on room air. Mouth suctioned. Productive cough. Lung sounds diminished. Sister at beside to answer admission questions. Cherrie Ferrer completed. Fall precautions in place.

## 2023-09-01 NOTE — PLAN OF CARE
Problem: Pain  Goal: Verbalizes/displays adequate comfort level or baseline comfort level  Outcome: Progressing   No signs of pain at this time. Problem: Safety - Adult  Goal: Free from fall injury  Outcome: Progressing   Standard safety measures in place.

## 2023-09-01 NOTE — CARE COORDINATION
09/38: Writer placed call [facility] [address] [facility] admission team, VM left for return call. TAMARA Bowden    Case Management Assessment  Initial Evaluation    Date/Time of Evaluation: 9/1/2023 1:03 PM  Assessment Completed by: TAMARA Bowden    If patient is discharged prior to next notation, then this note serves as note for discharge by case management. Patient Name: Carmen Hinton                   YOB: 1970  Diagnosis: Acute respiratory failure with hypoxia ([address]) [J96.01]  COVID [U07.1]                   Date / Time: 8/31/2023  8:25 PM    Patient Admission Status: Inpatient   Readmission Risk (Low < 19, Mod (19-27), High > 27): Readmission Risk Score: 11.4    Current PCP: Dianna Adler  PCP verified by CM? Yes    Chart Reviewed: Yes      History Provided by: Other (see comment), Medical Record (Spoke with primary RN at long term care [facility])  Patient Orientation: Other (see comment) (altert, unable to assess re nonverbal)    Patient Cognition: Alert    Hospitalization in the last 30 days (Readmission):  No      Advance Directives:      Code Status: DNR-CCA   Patient's Primary Decision Maker is: Legal Next of Kin    Primary Decision Maker: Anjum Nolasco - Parent - [phone]    Supplemental (Other) Decision Maker: Paul Edmonds - Brother/Sister - [phone]    Supplemental (Other) Decision Maker: Rajesh Duff - Brother/Sister - [phone]    Discharge Planning:    Patient lives with: Other (Comment) (ECF) Type of Home: Long-Term Care  Primary Care Giver:  Other (Comment) (staff)  Patient Support Systems include: Family Members, Home Care Staff   Current Financial resources: Medicare  Current services prior to admission: Extended Care Facility            Type of Home Care services:  [facility]    ADLS  Prior functional level: Assistance with the following:, Bathing, Dressing, Toileting, Feeding, Cooking, Housework, Shopping, Mobility  Current functional level:

## 2023-09-01 NOTE — PROGRESS NOTES
Admitted and seen by my colleague earlier this AM. Pt seen at bedside, no family there during my visit. He appears to be nonverbal, does not answer any of my questions and unclear if he understands. Not currently on any oxygen during my visit. Lung sounds are clear. Nicolette Mai mom checked on him at [facility] yesterday. He was \"really stiff\" and they checked a temp and discovered a fever. He was encouraged to go to the ED and brought in today. His sister Олег Severino) reports the center has been having an outbreak. She also reports he has been having a lot of congestion in his chest and has not been coughing or throat clearing very well. He was evaluated in the ED, covid positive, CXR essentially negative. However during monitoring in the ED he was found to have trouble clearing mucous secretions with intermittent hypoxia, so admitted for hypoxic respiratory failure. Sounds like he typically receives bolus feeds TID via GT at baseline.        SocHx:   - never    - no children   - does not drink   - does not smoke   - lives full time typically at [facility] neuromuscular center           Respiratory failure with hypoxia, covid infection, improving  - seems to have increased upper airway secretions and difficulty with clearing them at times resulting in intermittent hypoxia   - supplemental O2 support  - wean as tolerated   - albuterol q4 hrs prn wheezing   - CXR read as negative 8/31   - dexamethasone 6 mg GT daily x 10 days (with pantoprazole IV) - discussed with family - unclear on how much it could help, trial   - acetaminophen 650 mg per GT q4 hrs prn fever    - albuterol q4 hrs prn   - he was vaccinated against covid   - RT eval requested      Hyponatremia:   - has had low sodium since Jan 2020   - while on valproate so suspect siadh, but monitor for dehydration   - monitor     Spastic cerebral palsy, intellectual disability, Dysphagia / GT dependent:  - nonverbal at baseline   - pediatric MV po

## 2023-09-01 NOTE — CONSULTS
Comprehensive Nutrition Assessment    Type and Reason for Visit:  Initial, Consult, Positive Nutrition Screen    Nutrition Recommendations/Plan:   Modify to NPO  Recommend bolus feeds of Jevity 1.5, x 4 cans daily. Begin with 60 mL and increase by 60 mL at each following feed (120 mL at 2nd feeding, 180 mL at 3rd feeding, and full can (237 mL) at 4th feeding), or as tolerated. 60 mL free water flush before and after each administration. Monitor Na and need for adjustments  Monitor TF tolerance (abdominal pain, bloating, distention, diarrhea)  Obtain updated weight  Monitor nutrition adequacy, pertinent labs, bowel habits, wt changes, and clinical progress     Malnutrition Assessment:  Malnutrition Status: At risk for malnutrition (Comment) (09/01/23 4858)    Context:  Acute Illness     Findings of the 6 clinical characteristics of malnutrition:  Energy Intake:  Mild decrease in energy intake (Comment)  Fluid Accumulation:  Mild Extremities    Nutrition Assessment:    Consult for TF order and management. +IP for home tube feeds: 46 yo M w PMH dysphagia, epilepsy, spastic diplegic cerebral palsy, admitted w acute respiratory failure. Pt COVID-19 positive. Pt non-verbal at baseline. Current weight estimated, very little weight hx. G-tube in place. Currently on CLD w x1 0% po intake per EMR. Pt from [address] [place] [address]. Spoke w supervisor, reports pt receives bolus feeds of Vital 1.5 3x/day and is NPO at baseline. Recommend modifying diet to NPO. TF recommendations provided. Will monitor. Nutrition Related Findings:    G-tube. Active BS. +2 BUE and BLE edema. Na 130 (8/31). Wound Type: Pressure Injury, Stage II       Current Nutrition Intake & Therapies:    Average Meal Intake: 0%  Average Supplements Intake: None Ordered  ADULT DIET; Clear Liquid  Current Tube Feeding (TF) Orders:  Feeding Route: Gastrostomy  Formula: Standard with Fiber  Schedule:  Bolus  Goal TF & Flush Orders Provides: Bolus feeds

## 2023-09-01 NOTE — PROGRESS NOTES
First bolus tube feed given. Started at 60 mL, per order, with a 60 mL water flush before and after bolus. Pt tolerated well.

## 2023-09-01 NOTE — H&P
minutes will plan for ativan 2 mg IV   - seizure precautions     Anxiety:   - ativan 0.5 mg GT TID PRN anxiety      Constipation:   - miralax 8.5 gms GT daily     Hypothyroidism:   - levothyroxine 88 mcg GT daily     Supplements:   - unlisted and held for now   - Yasmine Jimenez reports that Emmy (Mom) typically gives Syliva Blush a lot of supplements     DNR -CCA       Disposition:   Current Living situation: at [facility]   Expected Disposition: to [facility]   Estimated D/C: 2-4 days (unclear when his infection will peak in terms of being through the worse of it - this maybe day 2-3 of infection and worse would be expected around day 4-5?)     Diet ADULT DIET; Clear Liquid   DVT Prophylaxis [x] Lovenox, []  Heparin, [] SCDs, [] Ambulation,  [] Eliquis, [] Xarelto, [] Coumadin   Code Status DNR-CCA   Surrogate Decision Maker/ POA - Mom (Anjum Nolasco) and Sister Yasmine Jimenez)      Personally reviewed Lab Studies and Imaging            History from:     Patient's sister Yasmine Jimenez     History of Present Illness:     Chief Complaint:  fever   Carmen Hinton is a 45 y.o. 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 He was \"really stiff\" and they checked a temp and discovered a fever. He was encouraged to go to the ED and brought in today. His sister Yasmine Jimenez) reports the center has been having an outbreak. She also reports he has been having a lot of congestion in his chest and has not been coughing or throat clearing very well. He was evaluated in the ED, covid positive, CXR essentially negative.    However during monitoring in the ED he was found to have trouble clearing mucous secretions with intermittent hypoxia, so admitted for Sun-Tues-Thurs-Sat and 30 mg bid on Mon-Wed-Fri    Historical Provider, MD   levothyroxine (SYNTHROID) 88 MCG tablet 1 tablet by Per G Tube route Daily    Historical Provider, MD       Physical Exam:    Physical Exam      General: NAD  Eyes: EOMI  ENT: neck supple  Cardiovascular: Regular rate. Respiratory: Clear to auscultation  - but does have rhonchi sounds from oropharynx     Gastrointestinal: Soft, non tender  - G tube present and site well appearing     Genitourinary: no suprapubic tenderness  Musculoskeletal: No edema  - contractures / deformities of his legs   - right arm contracture, held in flexion, right hand deformity     Skin: warm, dry  Neuro: Alert. Psych: Mood appropriate. 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 Allergies: No Known Allergies  Fam HX:   family history is not on file. Soc HX:   Social History     Socioeconomic History    Marital status: Single     Spouse name: None    Number of children: None    Years of education: None    Highest education level: None   Tobacco Use    Smoking status: Never    Smokeless tobacco: Never   Vaping Use    Vaping Use: Never used   Substance and Sexual Activity    Alcohol use: Never    Drug use: Never       Medications:   Medications:     PHENobarbital  30 mg Oral Daily    sodium chloride flush  5-40 mL IntraVENous 2 times per day    enoxaparin  40 mg SubCUTAneous Daily    dexamethasone  6 mg IntraVENous Daily    pantoprazole  40 mg IntraVENous Daily    aluminum & magnesium hydroxide-simethicone  15 mL Oral Daily    levothyroxine  88 mcg Per G Tube Daily    PHENobarbital  30 mg Per G Tube TID    polyethylene glycol

## 2023-09-01 NOTE — PLAN OF CARE
Problem: Safety - Adult  Goal: Free from fall injury  9/1/2023 1006 by Adin Spencer RN  Outcome: Progressing  Flowsheets (Taken 9/1/2023 1006)  Free From Fall Injury: Instruct family/caregiver on patient safety

## 2023-09-01 NOTE — PROGRESS NOTES
.4 Eyes Skin Assessment     The patient is being assess for  4 Eye Shift Assessment     I agree that 2 RN's have performed a thorough Head to Toe Skin Assessment on the patient. ALL assessment sites listed below have been assessed. Areas assessed by both nurses: Fidencio Andrew RN  [x]   Head, Face, and Ears   [x]   Shoulders, Back, and Chest  [x]   Arms, Elbows, and Hands   [x]   Coccyx, Sacrum, and IschIum  [x]   Legs, Feet, and Heels        Does the Patient have Skin Breakdown?   No         Kael Prevention initiated:  Yes   Wound Care Orders initiated:  NA      [facility] nurse consulted for Pressure Injury (Stage 3,4, Unstageable, DTI, NWPT, and Complex wounds), New and Established Ostomies:  NA      Nurse 1 eSignature: Electronically signed by Daniel Martini RN on 9/1/23 at 12:13 PM EDT    **SHARE this note so that the co-signing nurse is able to place an eSignature**    Nurse 2 eSignature: Electronically signed by Marce Rosen RN on 9/1/23 at 12:37 PM EDT

## 2023-09-01 NOTE — PROGRESS NOTES
Second bolus tube feed given. Went up to 120 mL, per order, with a 60 mL water flush before and after bolus. Pt tolerated well.

## 2023-09-02 LAB
ANION GAP SERPL CALCULATED.3IONS-SCNC: 6 MMOL/L (ref 3–16)
BASOPHILS # BLD: 0 K/UL (ref 0–0.2)
BASOPHILS NFR BLD: 0.3 %
BUN SERPL-MCNC: 7 MG/DL (ref 7–20)
CALCIUM SERPL-MCNC: 8.3 MG/DL (ref 8.3–10.6)
CHLORIDE SERPL-SCNC: 100 MMOL/L (ref 99–110)
CO2 SERPL-SCNC: 30 MMOL/L (ref 21–32)
CREAT SERPL-MCNC: <0.5 MG/DL (ref 0.9–1.3)
DEPRECATED RDW RBC AUTO: 13.3 % (ref 12.4–15.4)
EOSINOPHIL # BLD: 0 K/UL (ref 0–0.6)
EOSINOPHIL NFR BLD: 0.2 %
GFR SERPLBLD CREATININE-BSD FMLA CKD-EPI: >60 ML/MIN/{1.73_M2}
GLUCOSE SERPL-MCNC: 101 MG/DL (ref 70–99)
HCT VFR BLD AUTO: 35.3 % (ref 40.5–52.5)
HGB BLD-MCNC: 12.1 G/DL (ref 13.5–17.5)
LYMPHOCYTES # BLD: 1.5 K/UL (ref 1–5.1)
LYMPHOCYTES NFR BLD: 37.9 %
MCH RBC QN AUTO: 33.3 PG (ref 26–34)
MCHC RBC AUTO-ENTMCNC: 34.2 G/DL (ref 31–36)
MCV RBC AUTO: 97.2 FL (ref 80–100)
MONOCYTES # BLD: 0.4 K/UL (ref 0–1.3)
MONOCYTES NFR BLD: 11.1 %
NEUTROPHILS # BLD: 2.1 K/UL (ref 1.7–7.7)
NEUTROPHILS NFR BLD: 50.5 %
PLATELET # BLD AUTO: 123 K/UL (ref 135–450)
PMV BLD AUTO: 7.9 FL (ref 5–10.5)
POTASSIUM SERPL-SCNC: 3.7 MMOL/L (ref 3.5–5.1)
PROCALCITONIN SERPL IA-MCNC: 0.05 NG/ML (ref 0–0.15)
RBC # BLD AUTO: 3.63 M/UL (ref 4.2–5.9)
SODIUM SERPL-SCNC: 136 MMOL/L (ref 136–145)
WBC # BLD AUTO: 4.1 K/UL (ref 4–11)

## 2023-09-02 PROCEDURE — 1200000000 HC SEMI PRIVATE

## 2023-09-02 PROCEDURE — 6370000000 HC RX 637 (ALT 250 FOR IP): Performed by: PEDIATRICS

## 2023-09-02 PROCEDURE — 2580000003 HC RX 258: Performed by: NURSE PRACTITIONER

## 2023-09-02 PROCEDURE — 2580000003 HC RX 258: Performed by: PEDIATRICS

## 2023-09-02 PROCEDURE — C9113 INJ PANTOPRAZOLE SODIUM, VIA: HCPCS | Performed by: PEDIATRICS

## 2023-09-02 PROCEDURE — 80048 BASIC METABOLIC PNL TOTAL CA: CPT

## 2023-09-02 PROCEDURE — 84145 PROCALCITONIN (PCT): CPT

## 2023-09-02 PROCEDURE — 85025 COMPLETE CBC W/AUTO DIFF WBC: CPT

## 2023-09-02 PROCEDURE — 36415 COLL VENOUS BLD VENIPUNCTURE: CPT

## 2023-09-02 PROCEDURE — 6360000002 HC RX W HCPCS: Performed by: PEDIATRICS

## 2023-09-02 RX ORDER — 0.9 % SODIUM CHLORIDE 0.9 %
1000 INTRAVENOUS SOLUTION INTRAVENOUS ONCE
Status: COMPLETED | OUTPATIENT
Start: 2023-09-02 | End: 2023-09-02

## 2023-09-02 RX ADMIN — PHENOBARBITAL 30 MG: 30 TABLET ORAL at 20:56

## 2023-09-02 RX ADMIN — PHENOBARBITAL 30 MG: 30 TABLET ORAL at 08:39

## 2023-09-02 RX ADMIN — DEXAMETHASONE SODIUM PHOSPHATE 6 MG: 4 INJECTION, SOLUTION INTRAMUSCULAR; INTRAVENOUS at 08:24

## 2023-09-02 RX ADMIN — ENOXAPARIN SODIUM 40 MG: 100 INJECTION SUBCUTANEOUS at 08:25

## 2023-09-02 RX ADMIN — VALPROIC ACID 500 MG: 250 SOLUTION ORAL at 14:23

## 2023-09-02 RX ADMIN — VALPROIC ACID 500 MG: 250 SOLUTION ORAL at 20:56

## 2023-09-02 RX ADMIN — SODIUM CHLORIDE 1000 ML: 9 INJECTION, SOLUTION INTRAVENOUS at 00:35

## 2023-09-02 RX ADMIN — Medication 10 ML: at 20:57

## 2023-09-02 RX ADMIN — Medication 10 ML: at 08:24

## 2023-09-02 RX ADMIN — LEVOTHYROXINE SODIUM 88 MCG: 88 TABLET ORAL at 06:35

## 2023-09-02 RX ADMIN — PHENOBARBITAL 30 MG: 30 TABLET ORAL at 14:23

## 2023-09-02 RX ADMIN — PANTOPRAZOLE SODIUM 40 MG: 40 INJECTION, POWDER, FOR SOLUTION INTRAVENOUS at 08:24

## 2023-09-02 RX ADMIN — ALUMINUM HYDROXIDE, MAGNESIUM HYDROXIDE, AND SIMETHICONE 15 ML: 200; 200; 20 SUSPENSION ORAL at 08:37

## 2023-09-02 RX ADMIN — VALPROIC ACID 500 MG: 250 SOLUTION ORAL at 08:37

## 2023-09-02 RX ADMIN — POLYETHYLENE GLYCOL 3350 8.5 G: 17 POWDER, FOR SOLUTION ORAL at 08:36

## 2023-09-02 ASSESSMENT — PAIN SCALES - WONG BAKER
WONGBAKER_NUMERICALRESPONSE: 2
WONGBAKER_NUMERICALRESPONSE: HURTS A LITTLE BIT
WONGBAKER_NUMERICALRESPONSE: 2
WONGBAKER_NUMERICALRESPONSE: HURTS A LITTLE BIT
WONGBAKER_NUMERICALRESPONSE: HURTS A LITTLE BIT

## 2023-09-02 NOTE — PLAN OF CARE
Problem: Pain  Goal: Verbalizes/displays adequate comfort level or baseline comfort level  Outcome: Progressing  Flowsheets (Taken 9/1/2023 2340)  Verbalizes/displays adequate comfort level or baseline comfort level:   Encourage patient to monitor pain and request assistance   Administer analgesics based on type and severity of pain and evaluate response   Assess pain using appropriate pain scale   Consider cultural and social influences on pain and pain management   Implement non-pharmacological measures as appropriate and evaluate response   Notify Licensed Independent Practitioner if interventions unsuccessful or patient reports new pain     Problem: Safety - Adult  Goal: Free from fall injury  9/1/2023 2340 by Mary Schaffer  Outcome: Progressing  Flowsheets (Taken 9/1/2023 2340)  Free From Fall Injury:   Instruct family/caregiver on patient safety   Based on caregiver fall risk screen, instruct family/caregiver to ask for assistance with transferring infant if caregiver noted to have fall risk factors  9/1/2023 1006 by Albnio Duff RN  Outcome: Progressing  Flowsheets (Taken 9/1/2023 1006)  Free From Fall Injury: Instruct family/caregiver on patient safety     Problem: Skin/Tissue Integrity  Goal: Absence of new skin breakdown  Description: 1. Monitor for areas of redness and/or skin breakdown  2. Assess vascular access sites hourly  3. Every 4-6 hours minimum:  Change oxygen saturation probe site  4. Every 4-6 hours:  If on nasal continuous positive airway pressure, respiratory therapy assess nares and determine need for appliance change or resting period.   Outcome: Progressing

## 2023-09-02 NOTE — PLAN OF CARE
Problem: Pain  Goal: Verbalizes/displays adequate comfort level or baseline comfort level  9/2/2023 0834 by Marquita Miller RN  Outcome: Progressing  9/1/2023 2340 by Jia Herrera  Outcome: Progressing  Flowsheets (Taken 9/1/2023 2340)  Verbalizes/displays adequate comfort level or baseline comfort level:   Encourage patient to monitor pain and request assistance   Administer analgesics based on type and severity of pain and evaluate response   Assess pain using appropriate pain scale   Consider cultural and social influences on pain and pain management   Implement non-pharmacological measures as appropriate and evaluate response   Notify Licensed Independent Practitioner if interventions unsuccessful or patient reports new pain     Problem: Safety - Adult  Goal: Free from fall injury  9/2/2023 0834 by Marquita Miller RN  Outcome: Progressing  9/1/2023 2340 by Jia Herrera  Outcome: Progressing  Flowsheets (Taken 9/1/2023 2340)  Free From Fall Injury:   Instruct family/caregiver on patient safety   Based on caregiver fall risk screen, instruct family/caregiver to ask for assistance with transferring infant if caregiver noted to have fall risk factors     Problem: Skin/Tissue Integrity  Goal: Absence of new skin breakdown  Description: 1. Monitor for areas of redness and/or skin breakdown  2. Assess vascular access sites hourly  3. Every 4-6 hours minimum:  Change oxygen saturation probe site  4. Every 4-6 hours:  If on nasal continuous positive airway pressure, respiratory therapy assess nares and determine need for appliance change or resting period.   9/2/2023 0834 by Marquita Miller RN  Outcome: Progressing  9/1/2023 2340 by Jia Herrera  Outcome: Progressing

## 2023-09-02 NOTE — PROGRESS NOTES
Assessment completed and documented. VSS. Alert, nonverbal. Family at bedside. Patient with gtube, flushes without difficulty. X4 bolus feeds/ day. Strict NPO. Suction at bedside as needed. Q2turn with pillow support. Denies pain. Bed locked and in lowest position. Bedside table and call light within reach. Denies further needs at this time.

## 2023-09-02 NOTE — PROGRESS NOTES
Head to toe completed and documented. Pt alert and non-verbal. Does not appear to be in any pain. Third bolus of feeding given, per order. Pt was able to tolerate without complication. Call bell within reach.

## 2023-09-03 VITALS
RESPIRATION RATE: 16 BRPM | OXYGEN SATURATION: 91 % | HEIGHT: 60 IN | SYSTOLIC BLOOD PRESSURE: 94 MMHG | DIASTOLIC BLOOD PRESSURE: 60 MMHG | WEIGHT: 131.17 LBS | HEART RATE: 98 BPM | TEMPERATURE: 98.3 F | BODY MASS INDEX: 25.75 KG/M2

## 2023-09-03 PROCEDURE — 2580000003 HC RX 258: Performed by: PEDIATRICS

## 2023-09-03 PROCEDURE — C9113 INJ PANTOPRAZOLE SODIUM, VIA: HCPCS | Performed by: PEDIATRICS

## 2023-09-03 PROCEDURE — 6370000000 HC RX 637 (ALT 250 FOR IP): Performed by: PEDIATRICS

## 2023-09-03 PROCEDURE — 6360000002 HC RX W HCPCS: Performed by: PEDIATRICS

## 2023-09-03 RX ADMIN — Medication 10 ML: at 08:39

## 2023-09-03 RX ADMIN — VALPROIC ACID 500 MG: 250 SOLUTION ORAL at 08:39

## 2023-09-03 RX ADMIN — PANTOPRAZOLE SODIUM 40 MG: 40 INJECTION, POWDER, FOR SOLUTION INTRAVENOUS at 08:39

## 2023-09-03 RX ADMIN — ALUMINUM HYDROXIDE, MAGNESIUM HYDROXIDE, AND SIMETHICONE 15 ML: 200; 200; 20 SUSPENSION ORAL at 08:39

## 2023-09-03 RX ADMIN — LEVOTHYROXINE SODIUM 88 MCG: 88 TABLET ORAL at 05:26

## 2023-09-03 RX ADMIN — DEXAMETHASONE SODIUM PHOSPHATE 6 MG: 4 INJECTION, SOLUTION INTRAMUSCULAR; INTRAVENOUS at 08:39

## 2023-09-03 RX ADMIN — ENOXAPARIN SODIUM 40 MG: 100 INJECTION SUBCUTANEOUS at 08:39

## 2023-09-03 RX ADMIN — POLYETHYLENE GLYCOL 3350 8.5 G: 17 POWDER, FOR SOLUTION ORAL at 08:40

## 2023-09-03 RX ADMIN — PHENOBARBITAL 30 MG: 30 TABLET ORAL at 08:40

## 2023-09-03 NOTE — DISCHARGE SUMMARY
Hospital Medicine Discharge Summary    Patient: Brandon Agrawal   : 1970   []  Admit Date: 2023   Discharge Date:   23   Disposition:  [x]ECF   []HHC  []SNF  []Acute Rehab  []LTAC  []Hospice  Code status:  [x]DNR/CCA  []Limited (DNR/CCA with Do Not Intubate)  []DNRCC  Condition at Discharge: Stable  Primary Care Provider: Nash Caceres    Admitting Provider: Muna Kruger MD  Discharge Provider: HARJINDER Spaulding - NP     Discharge Diagnoses: Active Hospital Problems    Diagnosis     Acute respiratory failure with hypoxia ([address]) [J96.01]      Presenting Admission History:        Dina Zeng mom checked on him at [facility] yesterday. He was \"really stiff\" and they checked a temp and discovered a fever. He was encouraged to go to the ED and brought in today. His sister Pam Haider reports the center has been having an outbreak. She also reports he has been having a lot of congestion in his chest and has not been coughing or throat clearing very well. He was evaluated in the ED, covid positive, CXR essentially negative. However during monitoring in the ED he was found to have trouble clearing mucous secretions with intermittent hypoxia, so admitted for hypoxic respiratory failure. Sounds like he typically receives bolus feeds TID via GT at baseline.        SocHx:   - never    - no children   - does not drink   - does not smoke   - lives full time typically at [facility] neuromuscular center         Assessment/Plan:       Current Principal Problem:  Acute respiratory failure with hypoxia (HCC)        Respiratory failure with hypoxia, covid infection, resolved  - seems to have increased upper airway secretions and difficulty with clearing them at times resulting in intermittent hypoxia   - supplemental O2 support  - wean as tolerated   - albuterol q4 hrs prn wheezing   - CXR read as negative    - he was vaccinated against covid   - Supportive care     Hyponatremia, acute osseous findings. Soft tissues: Normal.     No acute pulmonary findings. Consults:     IP CONSULT TO DIETITIAN    Labs:     Recent Labs     08/31/23 2052 09/02/23  0538   WBC 4.3 4.1   HGB 13.9 12.1*   HCT 40.5 35.3*    123*     Recent Labs     08/31/23 2117 09/02/23  0538   * 136   K 3.9 3.7   CL 91* 100   CO2 29 30   BUN 10 7   CREATININE <0.5* <0.5*   CALCIUM 9.0 8.3     Recent Labs     08/31/23 2117 08/31/23  2332   TROPHS 13 15     No results for input(s): LABA1C in the last 72 hours. Recent Labs     08/31/23 2117   AST 22   ALT 15   BILITOT 0.4   ALKPHOS 50     No results for input(s): INR, LACTA, TSH in the last 72 hours. Urine Cultures:   Lab Results   Component Value Date/Time    LABURIN No growth at 18-36 hours 12/24/2019 09:02 PM     Blood Cultures:   Lab Results   Component Value Date/Time    [facility]  08/31/2023 08:52 PM     No Growth to date. Any change in status will be called. Lab Results   Component Value Date/Time    BLOODCULT2 No Growth after 4 days of incubation.  12/24/2019 07:42 PM     Organism: No results found for: ORG    Signed:    HARJINDER Swenson NP

## 2023-09-03 NOTE — PLAN OF CARE
Problem: Pain  Goal: Verbalizes/displays adequate comfort level or baseline comfort level  Outcome: Adequate for Discharge     Problem: Safety - Adult  Goal: Free from fall injury  Outcome: Adequate for Discharge     Problem: Skin/Tissue Integrity  Goal: Absence of new skin breakdown  Description: 1. Monitor for areas of redness and/or skin breakdown  2. Assess vascular access sites hourly  3. Every 4-6 hours minimum:  Change oxygen saturation probe site  4. Every 4-6 hours:  If on nasal continuous positive airway pressure, respiratory therapy assess nares and determine need for appliance change or resting period.   Outcome: Adequate for Discharge     Problem: Nutrition Deficit:  Goal: Optimize nutritional status  Outcome: Adequate for Discharge

## 2023-09-03 NOTE — CARE COORDINATION
CASE MANAGEMENT DISCHARGE SUMMARY      Discharge to: [address] Durable Medical Equipment ordered/agency: na    Transportation:    Medical Transport explained to pt/family. Pt/family voice no agency preference. Agency used:[facility]    time:1300   Ambulance form completed: Yes    Confirmed discharge plan with:RN, marilynn GuerreroAshwini     Patient: yes/no     Family:  yes/no    Name: Contact number:     Facility/Agency, name:  [facility] faxed [phone]   Phone number for report to facility: [phone]     RN, name: Farhana Epps    Note: Discharging nurse to complete [facility], reconcile [facility], and place final copy with patient's discharge packet. RN to ensure that written prescriptions for  Level II medications are sent with patient to the facility as per protocol.

## 2023-09-03 NOTE — PROGRESS NOTES
IV removed, no complications. VSS. Second bolus feed via Gtube given and tolerated. Report given to [facility] transport staff, denied questions. Patient left in stable condition.

## 2023-09-03 NOTE — PROGRESS NOTES
Assessment completed and documented. VSS. Alert, nonverbal. Denies pain. Tolerating bolus feed through [employer], meds through [employer]. Q2turn with pillow support, favors right side. Elevated heels off bed. Small bowel movement this morning. Total/ complete assist. NPO. Bed locked and in lowest position. Bedside table and call light within reach. Denies further needs at this time.

## 2023-09-04 LAB — BACTERIA BLD CULT: NORMAL

## 2023-09-13 NOTE — PROGRESS NOTES
Physician Progress Note      [address]  [facility] #:                  [id number]  :                       1970  ADMIT DATE:       2023 8:25 PM  [address] [facility] DATE:        9/3/2023 1:02 PM  RESPONDING  PROVIDER #:        Anabela Rhodes APRN - NP          QUERY TEXT:    Pt admitted with Covid. Noted documentation of sepsis on ED consult note. If   possible, please document in progress notes and discharge summary:      The medical record reflects the following:  Risk Factors: Cerebral Palsy, dysphagia, GT dependent  Clinical Indicators: Bands 27%, Pulse 128, temp 100.1, hyponatremia. Per ED   consult note \"Patient is hypoxic and septic secondary to COVID infection   require admission to the hospital for further medical management\". Treatment: 2L fluid bolus, albuterol, dexamethasone, blood cultures, serial   labs, supportive care. Thank you,  Claire Evans RN BSN  Options provided:  -- Sepsis present on arrival due to COVID-19 infection  -- Sepsis ruled out  -- Other - I will add my own diagnosis  -- Disagree - Not applicable / Not valid  -- Disagree - Clinically unable to determine / Unknown  -- Refer to Clinical Documentation Reviewer    PROVIDER RESPONSE TEXT:    The diagnosis of Sepsis was ruled out. Query created by: Claire Evans on 2023 7:53 AM      QUERY TEXT:    Patient admitted with Covid. Noted documentation of acute respiratory failure   in notes. No supplemental oxygen noted. In order to support the diagnosis of   acute respiratory failure, please include additional clinical indicators in   your documentation. Or please document if the diagnosis of acute respiratory   failure has been ruled out after further study. The medical record reflects the following:  Risk Factors: Covid, cerebral palsy, anxiety, dysphagia  Clinical Indicators: RR up to 18, Spo2 down to 85%, no supplemental oxygen.     Per H&P \"respiratory failure with hypoxia, covid infection: seems to

## 2023-09-14 NOTE — PROGRESS NOTES
Physician Progress Note      PATIENTLANE Silva  CSN #:                  [id number]  :                       1970  ADMIT DATE:       2023 8:25 PM  [facility] DATE:        9/3/2023 1:02 PM  RESPONDING  PROVIDER #:        Oscar Ivy NP          QUERY TEXT:    Pt admitted with Covid,  noted to have  Spastic cerebral palsy. If possible,   please document in the progress notes and/or discharge summary if you are   treating and/or evaluating any of the following:[[Spastic quadriplegic   cerebral palsy::This patient has Spastic quadriplegic cerebral palsy\". The medical record reflects the following:  Risk Factors: Intellectual disability, Covid, seizures  Clinical Indicators: Per notes and discharge summary \"Spastic cerebral palsy,   intellectual disability, Dysphagia / GT dependent:  has paralysis of his   entire right side at baseline\". Treatment: Serial labs, supportive care, RD consult, rube feeds, wheelchair    Functional quadriplegia (or quadriparesis) is defined as the complete   inability to move due to severe disability or frailty caused by another   medical condition without physical injury or damage to the brain or spinal   cord. 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     Query created by: Larry Frazier on 2023 3:50 PM      Electronically signed by:  Oscar Ivy NP 2023 8:03 AM

## (undated) DEVICE — SYRINGE MED 50ML LUERSLIP TIP

## (undated) DEVICE — ENDO CARRY-ON PROCEDURE KIT INCLUDES SUCTION TUBING, LUBRICANT, GAUZE, BIOHAZARD STICKER, TRANSPORT PAD AND INTERCEPT BEDSIDE KIT.: Brand: ENDO CARRY-ON PROCEDURE KIT

## (undated) DEVICE — BOWL MED M 16OZ PLAS CAP GRAD

## (undated) DEVICE — Z DISCONTINUED USE 2276105 GOWN PROTCT UNIV CHST W28IN L49IN SL 24IN BLU SPUNBOND FLM

## (undated) DEVICE — ELECTRODE,RADIOTRANSLUCENT,FOAM,3PK: Brand: MEDLINE

## (undated) DEVICE — LINER,SOFT,SUCTION CANISTER,1500CC: Brand: MEDLINE

## (undated) DEVICE — LENS EYEGLASS LTWT REPL DISP SAFEVIEW

## (undated) DEVICE — TUBING, SUCTION, 3/16" X 6', STRAIGHT: Brand: MEDLINE

## (undated) DEVICE — YANKAUER,BULB TIP,W/O VENT,RIGID,STERILE: Brand: MEDLINE

## (undated) DEVICE — TRAP,MUCUS SPECIMEN, 80CC: Brand: MEDLINE

## (undated) DEVICE — CANNULA,OXY,ADULT,SUPERSOFT,W/7'TUB,SC: Brand: MEDLINE INDUSTRIES, INC.

## (undated) DEVICE — SINGLE USE BIOPSY VALVE MAJ-210: Brand: SINGLE USE BIOPSY VALVE (STERILE)

## (undated) DEVICE — TUBING, SUCTION, 3/16" X 12', STRAIGHT: Brand: MEDLINE

## (undated) DEVICE — SYRINGE MED 10ML SLIP TIP BLNT FILL AND LUERLOCK DISP

## (undated) DEVICE — CONMED SCOPE SAVER BITE BLOCK, 20X27 MM: Brand: SCOPE SAVER

## (undated) DEVICE — SINGLE USE SUCTION VALVE MAJ-209: Brand: SINGLE USE SUCTION VALVE (STERILE)